# Patient Record
Sex: FEMALE | Race: WHITE | NOT HISPANIC OR LATINO | Employment: UNEMPLOYED | URBAN - METROPOLITAN AREA
[De-identification: names, ages, dates, MRNs, and addresses within clinical notes are randomized per-mention and may not be internally consistent; named-entity substitution may affect disease eponyms.]

---

## 2017-01-03 ENCOUNTER — ALLSCRIPTS OFFICE VISIT (OUTPATIENT)
Dept: OTHER | Facility: OTHER | Age: 42
End: 2017-01-03

## 2017-12-28 ENCOUNTER — LAB REQUISITION (OUTPATIENT)
Dept: LAB | Facility: HOSPITAL | Age: 42
End: 2017-12-28
Payer: COMMERCIAL

## 2017-12-28 ENCOUNTER — ALLSCRIPTS OFFICE VISIT (OUTPATIENT)
Dept: OTHER | Facility: OTHER | Age: 42
End: 2017-12-28

## 2017-12-28 DIAGNOSIS — R35.0 FREQUENCY OF MICTURITION: ICD-10-CM

## 2017-12-28 LAB
BILIRUB UR QL STRIP: NORMAL
CLARITY UR: NORMAL
COLOR UR: YELLOW
GLUCOSE (HISTORICAL): NORMAL
HGB UR QL STRIP.AUTO: NORMAL
KETONES UR STRIP-MCNC: NORMAL MG/DL
LEUKOCYTE ESTERASE UR QL STRIP: NORMAL
NITRITE UR QL STRIP: NORMAL
PH UR STRIP.AUTO: 7 [PH]
PROT UR STRIP-MCNC: NORMAL MG/DL
SP GR UR STRIP.AUTO: 1.01
UROBILINOGEN UR QL STRIP.AUTO: 0.2

## 2017-12-28 PROCEDURE — 87086 URINE CULTURE/COLONY COUNT: CPT | Performed by: FAMILY MEDICINE

## 2017-12-29 LAB — BACTERIA UR CULT: NORMAL

## 2017-12-29 NOTE — PROGRESS NOTES
Assessment   1  Urinary frequency (788 41) (R35 0)   2  Acute right-sided low back pain without sciatica (724 2) (M54 5)    Plan   Abnormal blood sugar, Benign hypertension, Essential familial hyperlipidemia,    Exogenous obesity, Schizophrenia    · (1) CBC/PLT/DIFF; Status:Hold For - Exact Date; Requested for:After S2610508;    · (1) COMPREHENSIVE METABOLIC PANEL; Status:Hold For - Exact Date; Requested    for:After S2610508;    · (1) HEMOGLOBIN A1C; Status:Hold For - Exact Date; Requested for:After S2610508;    · (1) LIPID PANEL FASTING W DIRECT LDL REFLEX; Status:Hold For - Exact Date; Requested for:After S2610508;    · (1) TSH WITH FT4 REFLEX; Status:Hold For - Exact Date; Requested for:After    S2610508;   Urinary frequency    · Ciprofloxacin HCl - 250 MG Oral Tablet; TAKE 1 TABLET EVERY 12 HOURS DAILY   · Urine Dip Automated- POC; Status:Complete - Retrospective By Protocol Authorization;      Done: 62NHH1255 10:56AM    Discussion/Summary      --right-sided low back pain/urinary frequency: 10 day hx of R sided LBP w/ associated urinary sxs (frequency)  No hematuria  no fevers/chills  Pain is worse with forward and side bending  No recent trauma  No gastrointestinal symptoms  Patient does not have a significant history of prior urinary tract infections  Urinalysis today revealed 1+ leukocytes  will send for UC  rec: drink plenty of fluids  will give rx for cipro 250 bid x 7 days  also, cont naproxen prn  will call w/ UC results  if neg, and sxs persist, will consider PT/xrays  also advised patient to schedule a physical examination in the near future  [facility] to physical     Possible side effects of new medications were reviewed with the patient/guardian today  The treatment plan was reviewed with the patient/guardian   The patient/guardian understands and agrees with the treatment plan      Chief Complaint   Pt c/o lower back pain that radiates around to her abdomen x 1 5 weeks  Pt states she's feeling better now that holidays are over and she's eating better  History of Present Illness   HPI: 10 day hx of R sided LBP w/ associated urinary sxs (frequency)  No hematuria  no fevers/chills  Pain is worse with forward and side bending  No recent trauma  No gastrointestinal symptoms  Patient does not have a significant history of prior urinary tract infections  Review of Systems        Constitutional: No fever, no chills, feels well, no tiredness, no recent weight gain or loss  Cardiovascular: no complaints of slow or fast heart rate, no chest pain, no palpitations, no leg claudication or lower extremity edema  Respiratory: no complaints of shortness of breath, no wheezing, no dyspnea on exertion, no orthopnea or PND  Gastrointestinal: no complaints of abdominal pain, no constipation, no nausea or diarrhea, no vomiting, no bloody stools  Genitourinary: as noted in HPI  Musculoskeletal: as noted in HPI  Active Problems   1  Abnormal blood sugar (790 29) (R73 09)   2  Benign hypertension (401 1) (I10)   3  Depression screening (V79 0) (Z13 89)   4  Essential familial hyperlipidemia (272 2) (E78 4)   5  Exogenous obesity (278 00) (E66 9)   6  Extrinsic asthma (493 00) (J45 909)   7  Myalgia And Myositis (729 1)   8  Obstruction of both eustachian tubes (381 60) (H68 103)   9  Premenstrual tension syndrome (625 4) (N94 3)   10  Schizophrenia (295 90) (F20 9)    Past Medical History   1  History of Blood pressure elevated (401 9) (I10)   2  History of Encounter for screening mammogram for malignant neoplasm of breast     (V76 12) (Z12 31)   3  History of acute sinusitis (V12 69) (Z87 09)   4  History of allergic rhinitis (V12 69) (Z87 09)   5  History of herpes zoster (V12 09) (Z86 19)  Active Problems And Past Medical History Reviewed: The active problems and past medical history were reviewed and updated today        Family History Mother    1  Family history of diabetes mellitus (V18 0) (Z83 3)   2  Family history of hyperlipidemia (V18 19) (Z83 49)   3  Family history of hypertension (V17 49) (Z82 49)   4  Family history of multiple sclerosis (V17 2) (Z82 0)    Social History    · Former smoker (O19 33) (S35 338)  The social history was reviewed and updated today  The social history was reviewed and is unchanged  Current Meds    1  Alyacen 1/35 1-35 MG-MCG Oral Tablet; Take 1 tablet daily; Therapy: 55OPI5533 to (Evaluate:30Jun2017)  Requested for: 62BNV4196; Last     XW:35RAT2564 Ordered   2  AmLODIPine Besylate 10 MG Oral Tablet; take one tablet by mouth every day; Therapy: 02KGI5227 to (Last Ming Gustafson)  Requested for: 53Jnu1815 Ordered   3  Benadryl Allergy CAPS; Therapy: (Deb Galeas) to Recorded   4  Daily Value Multivitamin TABS; Therapy: (Deb Galeas) to Recorded   5  Effexor  MG Oral Capsule Extended Release 24 Hour; TAKE 1 CAPSULE Daily     Recorded   6  Nortrel 1/35 (28) 1-35 MG-MCG Oral Tablet; TABS PO X 28; Therapy: 31YST3386 to (Evaluate:35Slz3219)  Requested for: 42Ume8504; Last     Rx:10Lgj4556 Ordered   7  SEROquel 200 MG Oral Tablet; TAKE 1 TABLET AT BEDTIME Recorded   8  Stelazine 10 MG TABS; Take 1 capsule twice daily Recorded     The medication list was reviewed and updated today  Allergies   1  Lisinopril TABS   2  Losartan Potassium-HCTZ TABS    Vitals    Recorded: 58Ccw6600 10:25AM   Temperature 98 3 F, Tympanic   Heart Rate 91   Respiration Quality Normal   Respiration 17   Systolic 691, LUE, Sitting   Diastolic 84, LUE, Sitting   Weight 171 lb 4 oz   BMI Calculated 26 04   BSA Calculated 1 91   O2 Saturation 99, RA   Pain Scale 2     Physical Exam        Constitutional      General appearance: No acute distress, well appearing and well nourished  Abdomen      Abdomen: Non-tender, no masses  -- no cva           Results/Data   Urine Dip Automated- POC [id number] 10: 56AM [facility]      Test Name Result Flag Reference   Color Yellow     Clarity Transparent     Leukocytes 1+     Nitrite -     Blood -     Bilirubin -     Urobilinogen 0 2     Protein -     Ph 7 0     Specific Gravity 1 015     Ketone -     Glucose -          Signatures    Electronically signed by :  Phani Dominguez DO; Dec 28 2017 10:59AM EST                       (Author)

## 2018-01-14 VITALS
BODY MASS INDEX: 26 KG/M2 | DIASTOLIC BLOOD PRESSURE: 82 MMHG | TEMPERATURE: 98.4 F | HEIGHT: 68 IN | SYSTOLIC BLOOD PRESSURE: 124 MMHG | WEIGHT: 171.56 LBS | OXYGEN SATURATION: 98 % | RESPIRATION RATE: 16 BRPM | HEART RATE: 97 BPM

## 2018-01-16 NOTE — PROGRESS NOTES
Assessment    1  Former smoker (V15 82) (M03 197)   2  Benign hypertension (401 1) (I10)   3  Essential familial hyperlipidemia (272 2) (E78 4)   4  Exogenous obesity (278 00) (E66 9)    Plan  Benign hypertension    · Lisinopril 10 MG Oral Tablet; TAKE 1 TABLET DAILY   · Begin a limited exercise program ; Status:Complete;   Done: 26NMY6621  Essential familial hyperlipidemia    · There are many exercise options for seniors ; Status:Complete;   Done: 44QFX0837    Discussion/Summary    --Hypertension: Will start lisinopril 10 mg daily  --Obesity: BMI 29  Patient's target weight loss is 25 pounds at this time  She is on an 6254-5924 calorie per day diet  She will be starting exercise program (with yoga) in the near future  Will continue to monitor  --Hyperlipidemia: Cholesterol 234/158  Patient is intolerant of red yeast  She would prefer to avoid prescription medication at this time  She will work hard on diet and exercise  Will repeat level in a few months    Recheck blood pressure in one month  Possible side effects of new medications were reviewed with the patient/guardian today  The treatment plan was reviewed with the patient/guardian  The patient/guardian understands and agrees with the treatment plan      Chief Complaint  Patient states here to f/u on her blood pressure; States has been elevated x approx  one year and last Wednesday when at Psych (163/100)  History of Present Illness  pts BP has been running high as gyn and psychiatrists office recently  +family hx of HTN  Patient would also like to discuss weight loss and I cholesterol      Review of Systems    Constitutional: No fever, no chills, feels well, no tiredness, no recent weight gain or weight loss  Cardiovascular: No complaints of slow heart rate, no fast heart rate, no chest pain, no palpitations, no leg claudication, no lower extremity edema     Respiratory: No complaints of shortness of breath, no wheezing, no cough, no SOB on exertion, no orthopnea, no PND  Gastrointestinal: No complaints of abdominal pain, no constipation, no nausea or vomiting, no diarrhea, no bloody stools  Genitourinary: No complaints of dysuria, no incontinence, no pelvic pain, no dysmenorrhea, no vaginal discharge or bleeding  Active Problems    1  Essential familial hyperlipidemia (272 2) (E78 4)   2  Extrinsic asthma (493 00) (J45 909)   3  Myalgia And Myositis (729 1)   4  Obstruction of both Eustachian tubes (381 60) (H68 103)   5  Premenstrual tension syndrome (625 4) (N94 3)   6  Schizophrenia (295 90) (F20 9)    Past Medical History    1  History of Blood pressure elevated (796 2) (I10)   2  History of Encounter for screening mammogram for malignant neoplasm of breast   (V76 12) (Z12 31)   3  History of acute sinusitis (V12 69) (Z87 09)   4  History of allergic rhinitis (V12 69) (Z87 09)    The active problems and past medical history were reviewed and updated today  Surgical History    The surgical history was reviewed and updated today  Family History    1  Family history of diabetes mellitus (V18 0) (Z83 3)   2  Family history of hyperlipidemia (V18 19) (Z83 49)   3  Family history of hypertension (V17 49) (Z82 49)   4  Family history of multiple sclerosis (V17 2) (Z82 0)    The family history was reviewed and updated today  Social History    · Former smoker (N18 74) (V40 243)  The social history was reviewed and updated today  The social history was reviewed and is unchanged  Current Meds   1  Benadryl Allergy CAPS; Therapy: (Berenice Shingles) to Recorded   2  Daily Value Multivitamin TABS; Therapy: (Berenice Shingles) to Recorded   3  Effexor  MG Oral Capsule Extended Release 24 Hour; TAKE 1 CAPSULE Daily   Recorded   4  Nortrel 1/35 (28) 1-35 MG-MCG Oral Tablet; TABS PO X 28; Therapy: 43DRY3976 to (Anahy Russo)  Requested for: 77KWA8228; Last   Rx:83Cbr8499 Ordered   5   SEROquel 200 MG Oral Tablet; TAKE 1 TABLET AT BEDTIME Recorded   6  Stelazine 10 MG TABS; Take 1 capsule twice daily Recorded    The medication list was reviewed and updated today  Allergies    1  No Known Drug Allergies    Vitals  Vital Signs [Data Includes: Current Encounter]    Recorded: 97ERY0079 02:36PM   Temperature 99 F   Heart Rate 93   Systolic 237   Diastolic 602   Height 5 ft 8 in   Weight 192 lb    BMI Calculated 29 19   BSA Calculated 2 02   O2 Saturation 98, RA   Pain Scale 0     Physical Exam    Constitutional   General appearance: No acute distress, well appearing and well nourished  Pulmonary   Respiratory effort: No increased work of breathing or signs of respiratory distress  Auscultation of lungs: Clear to auscultation  Cardiovascular   Palpation of heart: Normal PMI, no thrills  Auscultation of heart: Normal rate and rhythm, normal S1 and S2, without murmurs  Examination of extremities for edema and/or varicosities: Normal     Carotid pulses: Normal     Lymphatic   Palpation of lymph nodes in neck: No lymphadenopathy  Psychiatric   Orientation to person, place, and time: Normal     Mood and affect: Normal          Health Management  History of Encounter for screening mammogram for malignant neoplasm of breast   Digital Bilateral Screening Mammogram With CAD; every 1 year; Last 26HVO9103; Next Due:  89Bcl6527; Active    Signatures   Electronically signed by :  Radha Torrez DO; Jan 22 2016  2:58PM EST                       (Author)

## 2018-01-18 NOTE — PROGRESS NOTES
Assessment    1  Encounter for preventive health examination (V70 0) (Z00 00)   2  Benign hypertension (401 1) (I10)   3  Schizophrenia (295 90) (F20 9)   4  Essential familial hyperlipidemia (272 2) (E78 4)   5  Abnormal blood sugar (790 29) (R73 09)    Plan   Abnormal blood sugar, Benign hypertension, Essential familial hyperlipidemia    · (Q) COMPREHENSIVE METABOLIC PNL W/ADJUSTED CALCIUM; Status:Active; Requested MQW:38XZF3045;    · (Q) HEMOGLOBIN A1c WITH eAG; Status:Active; Requested OWL:43HLT9732;    · (Q) LIPID PANEL WITH REFLEX TO DIRECT LDL; Status:Active; Requested  XUL:86URY8557;   Benign hypertension    · From  AmLODIPine Besylate 5 MG Oral Tablet Take 1 tablet daily To  AmLODIPine Besylate 10 MG Oral Tablet (Norvasc) take one tablet by mouth every day  Depression screening    · *VB-Depression Screening; Status:Complete - Retrospective By Protocol Authorization;    Done: 73HIC4726 12:04PM    2 - Miley Rivera DO June  (Obstetrics/Gynecology) Physician Referral  Consult Only: the expectation is that the referring provider will communicate back to the patient on treatment options  Evaluation and Treatment: the expectation is that the referred to provider will communicate back to the patient on treatment options  Status: Hold For - Scheduling  Requested for: M561654  Ordered; For: Health Maintenance;  Ordered By: Glenny Lazo  Performed:   Due: 84RFZ2575     Discussion/Summary    68-year-old physical examination today  Overall, patient feels well  She has lost 25 pounds watching her diet and exercising  She is compliant with all current medications  Lab results from December 20 were reviewed with patient today (other than cholesterol, labs were normal)  I gave patient a referral to see a new gynecologist     --Lipids: Cholesterol 266/170  I had a long discussion with patient regarding diet and exercise  Patient is intolerant of red yeast rice  Will continue to monitor   Consider low-dose statin if levels worsen  --HTN: Controlled on amlodipine 10 mg daily  --Schizophrenia: depression screen today was positive  pt denies any SI  pt still taking effexor, stelazine, and seroquel  cont regular f/u w/ psychiatrist ( behavioral health)  --Prediabetes: A1c improved since patient has lost weight , now 5 4  Blood sugar 84  We'll continue to monitor    6 months, labs prior  Possible side effects of new medications were reviewed with the patient/guardian today  The treatment plan was reviewed with the patient/guardian  The patient/guardian understands and agrees with the treatment plan      Chief Complaint  Pt presents for annual physical with BW review done at [facility]  Pt states she is currently taking the Amlodipine 10 MG  History of Present Illness  HPI: Patient presents for 25-year-old physical examination today  Overall she is doing well  She has lost 25 pounds watching her diet and exercising  Patient still see psychiatrist on a regular basis  She is compliant with all prescribed medications  Review of Systems    Constitutional: No fever, no chills, feels well, no tiredness, no recent weight gain or weight loss  Cardiovascular: No complaints of slow heart rate, no fast heart rate, no chest pain, no palpitations, no leg claudication, no lower extremity edema  Respiratory: No complaints of shortness of breath, no wheezing, no cough, no SOB on exertion, no orthopnea, no PND  Gastrointestinal: No complaints of abdominal pain, no constipation, no nausea or vomiting, no diarrhea, no bloody stools  Genitourinary: No complaints of dysuria, no incontinence, no pelvic pain, no dysmenorrhea, no vaginal discharge or bleeding  Over the past 2 weeks, how often have you been bothered by the following problems? 1 ) Little interest or pleasure in doing things? Nearly every day  2 ) Feeling down, depressed or hopeless? Nearly every day  3 ) Trouble falling asleep or sleeping too much? Nearly every day  4  ) Feeling tired or having little energy? Nearly every day  5 ) Poor appetite or overeating? Several days  6 ) Feeling bad about yourself, or that you are a failure, or have let yourself or your family down? Half the days or more  7 ) Trouble concentrating on things, such as reading a newspaper or watching television? Several days  8 ) Moving or speaking so slowly that other people could have noticed, or the opposite, moving or speaking faster than usual? Half the days or more  severity of depression is moderately severe   How difficult have these problems made it for you to do your work, take care of things at home, or get along with people? Extremely difficult  Score 18      Active Problems    1  Abnormal blood sugar (790 29) (R73 09)   2  Benign hypertension (401 1) (I10)   3  Essential familial hyperlipidemia (272 2) (E78 4)   4  Exogenous obesity (278 00) (E66 9)   5  Extrinsic asthma (493 00) (J45 909)   6  Myalgia And Myositis (729 1)   7  Obstruction of both eustachian tubes (381 60) (H68 103)   8  Premenstrual tension syndrome (625 4) (N94 3)   9  Schizophrenia (295 90) (F20 9)    Past Medical History    · History of Blood pressure elevated (401 9) (I10)   · History of Encounter for screening mammogram for malignant neoplasm of breast  (V76 12) (Z12 31)   · History of acute sinusitis (V12 69) (Z87 09)   · History of allergic rhinitis (V12 69) (Z87 09)   · History of herpes zoster (V12 09) (Z86 19)    Family History  Mother    · Family history of diabetes mellitus (V18 0) (Z83 3)   · Family history of hyperlipidemia (V18 19) (Z83 49)   · Family history of hypertension (V17 49) (Z82 49)   · Family history of multiple sclerosis (V17 2) (Z82 0)    Social History    · Former smoker (V15 82) (R88 330)    Current Meds   1  Alyacen 1/35 1-35 MG-MCG Oral Tablet; Take 1 tablet daily; Therapy: 47EBU3905 to (Abril Khanna)  Requested for: 61MAD1711; Last   Rx:19Nov2016 Ordered   2   AmLODIPine Besylate 5 MG Oral Tablet; Take 1 tablet daily; Therapy: 13YZU1500 to (Evaluate:66Yjj0597)  Requested for: 02UKO2526; Last   Rx:60Szb6904 Ordered   3  Benadryl Allergy CAPS; Therapy: (Ni Garcia) to Recorded   4  Daily Value Multivitamin TABS; Therapy: (Ni Garcia) to Recorded   5  Effexor  MG Oral Capsule Extended Release 24 Hour; TAKE 1 CAPSULE Daily   Recorded   6  Nortrel 1/35 (28) 1-35 MG-MCG Oral Tablet; TABS PO X 28; Therapy: 45XBB5915 to (Evaluate:50Zeh5209)  Requested for: 96Mvo0212; Last   Rx:84Knd6372 Ordered   7  SEROquel 200 MG Oral Tablet; TAKE 1 TABLET AT BEDTIME Recorded   8  Stelazine 10 MG TABS; Take 1 capsule twice daily Recorded    Allergies    1  Lisinopril TABS   2  Losartan Potassium-HCTZ TABS    Vitals   Recorded: 02RQC6827 12:31PM Recorded: 40UWO5620 11:59AM   Temperature  98 4 F, Tympanic   Heart Rate  97, R Radial   Pulse Quality  Norm   Respiration Quality  Norm   Respiration  16   Systolic 696 729, LUE, Sitting   Diastolic 82 80, LUE, Sitting   Height  5 ft 8 in   Weight  171 lb 8 96 oz   BMI Calculated  26 09   BSA Calculated  1 92   O2 Saturation  98, RA   LMP  17-Dec-2016   Pain Scale  0     Physical Exam    Constitutional   General appearance: No acute distress, well appearing and well nourished  Ears, Nose, Mouth, and Throat   Oropharynx: Normal with no erythema, edema, exudate or lesions  Pulmonary   Respiratory effort: No increased work of breathing or signs of respiratory distress  Auscultation of lungs: Clear to auscultation  Cardiovascular   Palpation of heart: Normal PMI, no thrills  Auscultation of heart: Normal rate and rhythm, normal S1 and S2, without murmurs  Examination of extremities for edema and/or varicosities: Normal     Abdomen   Abdomen: Non-tender, no masses  Liver and spleen: No hepatomegaly or splenomegaly  Lymphatic   Palpation of lymph nodes in neck: No lymphadenopathy      Musculoskeletal   Gait and station: Normal     Psychiatric   Orientation to person, place, and time: Normal     Mood and affect: Normal        Results/Data  *VB-Depression Screening 27TPJ3178 12:04PM Tsosie Shed     Test Name Result Flag Reference   Depression Scale Result      Depression Screen - Positive Findings       Health Management  History of Encounter for screening mammogram for malignant neoplasm of breast   Digital Bilateral Screening Mammogram With CAD; every 1 year; Last 38HEL4524; Next  Due: 82Sau3901; Overdue    Signatures   Electronically signed by :  Helena Bosch DO; Juanito  3 2017 12:34PM EST                       (Author)

## 2018-01-23 VITALS
SYSTOLIC BLOOD PRESSURE: 124 MMHG | TEMPERATURE: 98.3 F | RESPIRATION RATE: 17 BRPM | HEART RATE: 91 BPM | DIASTOLIC BLOOD PRESSURE: 84 MMHG | BODY MASS INDEX: 26.04 KG/M2 | WEIGHT: 171.25 LBS | OXYGEN SATURATION: 99 %

## 2018-02-09 ENCOUNTER — TELEPHONE (OUTPATIENT)
Dept: FAMILY MEDICINE CLINIC | Facility: CLINIC | Age: 43
End: 2018-02-09

## 2018-02-09 DIAGNOSIS — I10 ESSENTIAL HYPERTENSION: Primary | ICD-10-CM

## 2018-02-09 RX ORDER — QUETIAPINE FUMARATE 200 MG/1
1 TABLET, FILM COATED ORAL
COMMUNITY

## 2018-02-09 RX ORDER — AMLODIPINE BESYLATE 10 MG/1
10 TABLET ORAL DAILY
Qty: 30 TABLET | Refills: 5 | Status: SHIPPED | OUTPATIENT
Start: 2018-02-09 | End: 2018-08-03 | Stop reason: SDUPTHER

## 2018-02-09 RX ORDER — AMLODIPINE BESYLATE 10 MG/1
1 TABLET ORAL DAILY
COMMUNITY
Start: 2016-03-01 | End: 2018-02-09 | Stop reason: SDUPTHER

## 2018-02-09 RX ORDER — VENLAFAXINE HYDROCHLORIDE 150 MG/1
1 CAPSULE, EXTENDED RELEASE ORAL DAILY
COMMUNITY

## 2018-02-09 NOTE — TELEPHONE ENCOUNTER
Pt noticed that her last rx for amlodipine was sent in as 5mg, She has been on 10mg and has been doubling the 5mg   Can a new rx for 10mg be sent to [facility]

## 2018-05-02 LAB
ALBUMIN SERPL-MCNC: 4 G/DL (ref 3.6–5.1)
ALBUMIN/GLOB SERPL: 1.4 (CALC) (ref 1–2.5)
ALP SERPL-CCNC: 81 U/L (ref 33–115)
ALT SERPL-CCNC: 13 U/L (ref 6–29)
AST SERPL-CCNC: 14 U/L (ref 10–35)
BILIRUB SERPL-MCNC: 0.3 MG/DL (ref 0.2–1.2)
BUN SERPL-MCNC: 12 MG/DL (ref 7–25)
BUN/CREAT SERPL: NORMAL (CALC) (ref 6–22)
CALCIUM ALBUM COR SERPL-MCNC: 8.9 MG/DL (CALC) (ref 8.6–10.2)
CALCIUM SERPL-MCNC: 8.6 MG/DL (ref 8.6–10.2)
CHLORIDE SERPL-SCNC: 102 MMOL/L (ref 98–110)
CHOLEST SERPL-MCNC: 222 MG/DL
CHOLEST/HDLC SERPL: 4.9 (CALC)
CO2 SERPL-SCNC: 26 MMOL/L (ref 20–31)
CREAT SERPL-MCNC: 1.01 MG/DL (ref 0.5–1.1)
EST. AVERAGE GLUCOSE BLD GHB EST-MCNC: 100 (CALC)
EST. AVERAGE GLUCOSE BLD GHB EST-SCNC: 5.5 (CALC)
GLOBULIN SER CALC-MCNC: 2.9 G/DL (CALC) (ref 1.9–3.7)
GLUCOSE SERPL-MCNC: 79 MG/DL (ref 65–99)
HBA1C MFR BLD: 5.1 % OF TOTAL HGB
HDLC SERPL-MCNC: 45 MG/DL
LDLC SERPL CALC-MCNC: 141 MG/DL (CALC)
NONHDLC SERPL-MCNC: 177 MG/DL (CALC)
POTASSIUM SERPL-SCNC: 4.4 MMOL/L (ref 3.5–5.3)
PROT SERPL-MCNC: 6.9 G/DL (ref 6.1–8.1)
SL AMB EGFR AFRICAN AMERICAN: 78 ML/MIN/1.73M2
SL AMB EGFR NON AFRICAN AMERICAN: 67 ML/MIN/1.73M2
SODIUM SERPL-SCNC: 137 MMOL/L (ref 135–146)
TRIGL SERPL-MCNC: 221 MG/DL

## 2018-05-14 ENCOUNTER — OFFICE VISIT (OUTPATIENT)
Dept: FAMILY MEDICINE CLINIC | Facility: CLINIC | Age: 43
End: 2018-05-14
Payer: COMMERCIAL

## 2018-05-14 VITALS
WEIGHT: 174.56 LBS | SYSTOLIC BLOOD PRESSURE: 118 MMHG | BODY MASS INDEX: 26.46 KG/M2 | HEART RATE: 94 BPM | HEIGHT: 68 IN | TEMPERATURE: 98.6 F | RESPIRATION RATE: 18 BRPM | DIASTOLIC BLOOD PRESSURE: 80 MMHG | OXYGEN SATURATION: 98 %

## 2018-05-14 DIAGNOSIS — I10 BENIGN HYPERTENSION: ICD-10-CM

## 2018-05-14 DIAGNOSIS — F20.9 SCHIZOPHRENIA, UNSPECIFIED TYPE (HCC): ICD-10-CM

## 2018-05-14 DIAGNOSIS — Z00.00 WELL ADULT EXAM: Primary | ICD-10-CM

## 2018-05-14 DIAGNOSIS — R73.09 ABNORMAL BLOOD SUGAR: ICD-10-CM

## 2018-05-14 DIAGNOSIS — E78.49 ESSENTIAL FAMILIAL HYPERLIPIDEMIA: ICD-10-CM

## 2018-05-14 PROCEDURE — 99396 PREV VISIT EST AGE 40-64: CPT | Performed by: FAMILY MEDICINE

## 2018-05-14 RX ORDER — ERGOCALCIFEROL (VITAMIN D2) 10 MCG
TABLET ORAL DAILY
COMMUNITY

## 2018-05-14 RX ORDER — TRIFLUOPERAZINE HYDROCHLORIDE 10 MG/1
1 TABLET, FILM COATED ORAL 2 TIMES DAILY
COMMUNITY

## 2018-05-14 NOTE — ASSESSMENT & PLAN NOTE
Overall stable on Effexor, Stelazine, in Seroquel  Patient's psychiatrist is slowly weaning her off of Benadryl

## 2018-05-14 NOTE — PROGRESS NOTES
Assessment/Plan:    Essential familial hyperlipidemia  Chol 222/141, was 266/170  Pt intolerent of red yeast rice  Much improved  Will cont to monitor  Benign hypertension  Well controlled on amlodipine 10 mg qd    Abnormal blood sugar  BS 79, A1C 5 1%  Cont reduced diet  Will cont to monitor  Schizophrenia ([facility] [address] )   Overall stable on Effexor, Stelazine, in Seroquel  Patient's psychiatrist is slowly weaning her off of Benadryl  Diagnoses and all orders for this visit:    Well adult exam  Comments:  38 yo PE today  overall, pt is doiing well  labs reviewed  recheck 3 mos (if stable, then back to q6)    Benign hypertension  -     CBC and differential; Future  -     TSH, 3rd generation with T4 reflex; Future    Essential familial hyperlipidemia  -     Comprehensive metabolic panel; Future  -     Lipid Panel with Direct LDL reflex; Future  -     TSH, 3rd generation with T4 reflex; Future    Abnormal blood sugar  -     Comprehensive metabolic panel; Future  -     HEMOGLOBIN A1C W/ EAG ESTIMATION; Future  -     TSH, 3rd generation with T4 reflex; Future    Schizophrenia, unspecified type ([facility] [address] )    Other orders  -     Diphenhydramine-Phenylephrine 25-10 MG TABS; Take 25 mg by mouth daily     3 mos for med check, if stable, will return to q 6 mos    (6 mos, FBW prior at [facility])    Subjective:      Patient ID: Dawson Liang is a 37 y o  female  Patient presents for recheck of chronic medical problems today  Her psychiatrist is slowly weaning her off of Benadryl and she is having some withdrawal symptoms on this  Doing well on blood pressure medication, amlodipine  Patient did not tolerate red yeast rice for cholesterol    She had labs drawn on May 1st        The following portions of the patient's history were reviewed and updated as appropriate: allergies, current medications, past family history, past medical history, past social history, past surgical history and problem list     Review of Systems Respiratory: Negative  Cardiovascular: Negative  Gastrointestinal: Negative  Genitourinary: Negative  Objective:      /80 (BP Location: Left arm, Patient Position: Sitting, Cuff Size: Adult)   Pulse 94   Temp 98 6 °F (37 °C) (Tympanic)   Resp 18   Ht 5' 8" (1 727 m)   Wt 79 2 kg (174 lb 9 oz)   SpO2 98%   Breastfeeding? No   BMI 26 54 kg/m²          Physical Exam   Constitutional: She is oriented to person, place, and time  She appears well-developed and well-nourished  HENT:   Head: Normocephalic and atraumatic  Right Ear: External ear normal    Left Ear: External ear normal    Mouth/Throat: Oropharynx is clear and moist    Eyes: Pupils are equal, round, and reactive to light  Neck: Normal range of motion  Neck supple  Cardiovascular: Normal rate, regular rhythm and normal heart sounds  Pulmonary/Chest: Effort normal and breath sounds normal    Abdominal: Soft  Bowel sounds are normal    Neurological: She is alert and oriented to person, place, and time  She has normal reflexes  Psychiatric: She has a normal mood and affect  Her behavior is normal  Judgment and thought content normal    Nursing note and vitals reviewed

## 2018-08-03 DIAGNOSIS — I10 ESSENTIAL HYPERTENSION: ICD-10-CM

## 2018-08-03 RX ORDER — AMLODIPINE BESYLATE 10 MG/1
10 TABLET ORAL DAILY
Qty: 30 TABLET | Refills: 5 | Status: SHIPPED | OUTPATIENT
Start: 2018-08-03 | End: 2018-11-27 | Stop reason: SDUPTHER

## 2018-11-26 DIAGNOSIS — I10 ESSENTIAL HYPERTENSION: ICD-10-CM

## 2018-11-27 RX ORDER — AMLODIPINE BESYLATE 10 MG/1
10 TABLET ORAL DAILY
Qty: 30 TABLET | Refills: 0 | Status: SHIPPED | OUTPATIENT
Start: 2018-11-27 | End: 2019-01-08 | Stop reason: SDUPTHER

## 2018-11-27 NOTE — TELEPHONE ENCOUNTER
Pt is due for 6 month follow up with FBW  I left message on pt's cellphone informing her (consent ok)       Teed up for 30 tablets with no refills if appropriate

## 2018-11-30 ENCOUNTER — OFFICE VISIT (OUTPATIENT)
Dept: FAMILY MEDICINE CLINIC | Facility: CLINIC | Age: 43
End: 2018-11-30
Payer: COMMERCIAL

## 2018-11-30 VITALS
HEIGHT: 68 IN | WEIGHT: 181 LBS | BODY MASS INDEX: 27.43 KG/M2 | SYSTOLIC BLOOD PRESSURE: 112 MMHG | DIASTOLIC BLOOD PRESSURE: 80 MMHG | OXYGEN SATURATION: 97 % | RESPIRATION RATE: 16 BRPM | HEART RATE: 101 BPM | TEMPERATURE: 98.7 F

## 2018-11-30 DIAGNOSIS — R73.09 ABNORMAL BLOOD SUGAR: ICD-10-CM

## 2018-11-30 DIAGNOSIS — J01.00 ACUTE NON-RECURRENT MAXILLARY SINUSITIS: Primary | ICD-10-CM

## 2018-11-30 DIAGNOSIS — E78.49 ESSENTIAL FAMILIAL HYPERLIPIDEMIA: ICD-10-CM

## 2018-11-30 DIAGNOSIS — I10 BENIGN HYPERTENSION: ICD-10-CM

## 2018-11-30 LAB
ALBUMIN SERPL BCP-MCNC: 3.6 G/DL (ref 3.5–5)
ALP SERPL-CCNC: 99 U/L (ref 46–116)
ALT SERPL W P-5'-P-CCNC: 25 U/L (ref 12–78)
ANION GAP SERPL CALCULATED.3IONS-SCNC: 10 MMOL/L (ref 4–13)
AST SERPL W P-5'-P-CCNC: 14 U/L (ref 5–45)
BASOPHILS # BLD AUTO: 0.03 THOUSANDS/ΜL (ref 0–0.1)
BASOPHILS NFR BLD AUTO: 0 % (ref 0–1)
BILIRUB SERPL-MCNC: 0.36 MG/DL (ref 0.2–1)
BUN SERPL-MCNC: 8 MG/DL (ref 5–25)
CALCIUM SERPL-MCNC: 9.2 MG/DL (ref 8.3–10.1)
CHLORIDE SERPL-SCNC: 98 MMOL/L (ref 100–108)
CHOLEST SERPL-MCNC: 209 MG/DL (ref 50–200)
CO2 SERPL-SCNC: 22 MMOL/L (ref 21–32)
CREAT SERPL-MCNC: 0.93 MG/DL (ref 0.6–1.3)
EOSINOPHIL # BLD AUTO: 0.17 THOUSAND/ΜL (ref 0–0.61)
EOSINOPHIL NFR BLD AUTO: 2 % (ref 0–6)
ERYTHROCYTE [DISTWIDTH] IN BLOOD BY AUTOMATED COUNT: 12.8 % (ref 11.6–15.1)
EST. AVERAGE GLUCOSE BLD GHB EST-MCNC: 117 MG/DL
GFR SERPL CREATININE-BSD FRML MDRD: 74 ML/MIN/1.73SQ M
GLUCOSE P FAST SERPL-MCNC: 77 MG/DL (ref 65–99)
HBA1C MFR BLD: 5.7 % (ref 4.2–6.3)
HCT VFR BLD AUTO: 43.7 % (ref 34.8–46.1)
HDLC SERPL-MCNC: 41 MG/DL (ref 40–60)
HGB BLD-MCNC: 14.3 G/DL (ref 11.5–15.4)
IMM GRANULOCYTES # BLD AUTO: 0.05 THOUSAND/UL (ref 0–0.2)
IMM GRANULOCYTES NFR BLD AUTO: 0 % (ref 0–2)
LDLC SERPL CALC-MCNC: 128 MG/DL (ref 0–100)
LYMPHOCYTES # BLD AUTO: 2.28 THOUSANDS/ΜL (ref 0.6–4.47)
LYMPHOCYTES NFR BLD AUTO: 20 % (ref 14–44)
MCH RBC QN AUTO: 29.8 PG (ref 26.8–34.3)
MCHC RBC AUTO-ENTMCNC: 32.7 G/DL (ref 31.4–37.4)
MCV RBC AUTO: 91 FL (ref 82–98)
MONOCYTES # BLD AUTO: 0.81 THOUSAND/ΜL (ref 0.17–1.22)
MONOCYTES NFR BLD AUTO: 7 % (ref 4–12)
NEUTROPHILS # BLD AUTO: 8.21 THOUSANDS/ΜL (ref 1.85–7.62)
NEUTS SEG NFR BLD AUTO: 71 % (ref 43–75)
NRBC BLD AUTO-RTO: 0 /100 WBCS
PLATELET # BLD AUTO: 571 THOUSANDS/UL (ref 149–390)
PMV BLD AUTO: 10.1 FL (ref 8.9–12.7)
POTASSIUM SERPL-SCNC: 3.6 MMOL/L (ref 3.5–5.3)
PROT SERPL-MCNC: 8 G/DL (ref 6.4–8.2)
RBC # BLD AUTO: 4.8 MILLION/UL (ref 3.81–5.12)
SODIUM SERPL-SCNC: 130 MMOL/L (ref 136–145)
TRIGL SERPL-MCNC: 198 MG/DL
TSH SERPL DL<=0.05 MIU/L-ACNC: 2.37 UIU/ML (ref 0.36–3.74)
WBC # BLD AUTO: 11.55 THOUSAND/UL (ref 4.31–10.16)

## 2018-11-30 PROCEDURE — 80061 LIPID PANEL: CPT | Performed by: FAMILY MEDICINE

## 2018-11-30 PROCEDURE — 85025 COMPLETE CBC W/AUTO DIFF WBC: CPT | Performed by: FAMILY MEDICINE

## 2018-11-30 PROCEDURE — 99213 OFFICE O/P EST LOW 20 MIN: CPT | Performed by: FAMILY MEDICINE

## 2018-11-30 PROCEDURE — 36415 COLL VENOUS BLD VENIPUNCTURE: CPT | Performed by: FAMILY MEDICINE

## 2018-11-30 PROCEDURE — 3008F BODY MASS INDEX DOCD: CPT | Performed by: FAMILY MEDICINE

## 2018-11-30 PROCEDURE — 83036 HEMOGLOBIN GLYCOSYLATED A1C: CPT | Performed by: FAMILY MEDICINE

## 2018-11-30 PROCEDURE — 80053 COMPREHEN METABOLIC PANEL: CPT | Performed by: FAMILY MEDICINE

## 2018-11-30 PROCEDURE — 84443 ASSAY THYROID STIM HORMONE: CPT | Performed by: FAMILY MEDICINE

## 2018-11-30 RX ORDER — AMOXICILLIN 875 MG/1
875 TABLET, COATED ORAL 2 TIMES DAILY
Qty: 20 TABLET | Refills: 0 | Status: SHIPPED | OUTPATIENT
Start: 2018-11-30 | End: 2018-12-10

## 2018-11-30 RX ORDER — LEVONORGESTREL / ETHINYL ESTRADIOL AND ETHINYL ESTRADIOL 150-30(84)
KIT ORAL
COMMUNITY
End: 2019-07-09 | Stop reason: SDUPTHER

## 2018-11-30 NOTE — PROGRESS NOTES
Assessment/Plan:       Diagnoses and all orders for this visit:    Acute non-recurrent maxillary sinusitis  Comments:  amoxil 875 bid x 10 days  drink plenty fluids  call further probs  Orders:  -     amoxicillin (AMOXIL) 875 mg tablet; Take 1 tablet (875 mg total) by mouth 2 (two) times a day for 10 days     WILL DRAW ROUTINE FBW TODAY  PT HAS F/U APPT SCHEDULED    Subjective:      Patient ID: Monica Wright is a 37 y o  female  1 week hx of nasal congestion/pressure, swollen lymph node, no fevers  The following portions of the patient's history were reviewed and updated as appropriate: allergies, current medications, past family history, past medical history, past social history, past surgical history and problem list     Review of Systems   Constitutional: Negative for chills and fever  HENT: Positive for congestion and postnasal drip  Respiratory: Positive for cough  Negative for shortness of breath  Cardiovascular: Negative  Objective:      /80 (BP Location: Left arm, Patient Position: Sitting, Cuff Size: Adult)   Pulse 101   Temp 98 7 °F (37 1 °C) (Tympanic)   Resp 16   Ht 5' 8" (1 727 m)   Wt 82 1 kg (181 lb)   SpO2 97%   BMI 27 52 kg/m²          Physical Exam   Constitutional: She appears well-developed and well-nourished  HENT:   Turbinates inflamed   Neck: Normal range of motion  Neck supple     Pulmonary/Chest: Effort normal and breath sounds normal

## 2019-01-08 ENCOUNTER — OFFICE VISIT (OUTPATIENT)
Dept: FAMILY MEDICINE CLINIC | Facility: CLINIC | Age: 44
End: 2019-01-08
Payer: COMMERCIAL

## 2019-01-08 VITALS
TEMPERATURE: 100.3 F | WEIGHT: 185 LBS | OXYGEN SATURATION: 97 % | RESPIRATION RATE: 16 BRPM | SYSTOLIC BLOOD PRESSURE: 120 MMHG | BODY MASS INDEX: 28.04 KG/M2 | HEART RATE: 112 BPM | DIASTOLIC BLOOD PRESSURE: 80 MMHG | HEIGHT: 68 IN

## 2019-01-08 DIAGNOSIS — I10 ESSENTIAL HYPERTENSION: ICD-10-CM

## 2019-01-08 DIAGNOSIS — R73.09 ABNORMAL BLOOD SUGAR: ICD-10-CM

## 2019-01-08 DIAGNOSIS — I10 BENIGN HYPERTENSION: ICD-10-CM

## 2019-01-08 DIAGNOSIS — F20.9 SCHIZOPHRENIA, UNSPECIFIED TYPE (HCC): ICD-10-CM

## 2019-01-08 DIAGNOSIS — E78.49 ESSENTIAL FAMILIAL HYPERLIPIDEMIA: Primary | ICD-10-CM

## 2019-01-08 PROCEDURE — 99214 OFFICE O/P EST MOD 30 MIN: CPT | Performed by: FAMILY MEDICINE

## 2019-01-08 PROCEDURE — 3074F SYST BP LT 130 MM HG: CPT | Performed by: FAMILY MEDICINE

## 2019-01-08 PROCEDURE — 3079F DIAST BP 80-89 MM HG: CPT | Performed by: FAMILY MEDICINE

## 2019-01-08 PROCEDURE — 1036F TOBACCO NON-USER: CPT | Performed by: FAMILY MEDICINE

## 2019-01-08 PROCEDURE — 3008F BODY MASS INDEX DOCD: CPT | Performed by: FAMILY MEDICINE

## 2019-01-08 RX ORDER — AMLODIPINE BESYLATE 5 MG/1
5 TABLET ORAL DAILY
Qty: 90 TABLET | Refills: 1 | Status: SHIPPED | OUTPATIENT
Start: 2019-01-08 | End: 2019-02-25 | Stop reason: SDUPTHER

## 2019-01-08 NOTE — ASSESSMENT & PLAN NOTE
Blood sugar 77, A1c 5 7%  Discussed improving diet and increasing exercise    Will continue to monitor yearly

## 2019-01-08 NOTE — PROGRESS NOTES
[facility]      NAME: Wyatt Bond  AGE: 37 y o  SEX: female  : 1975   MRN: [id number]    DATE: 2019  TIME: 6:09 PM    Assessment and Plan     Problem List Items Addressed This Visit     Benign hypertension      Blood pressure is well controlled on amlodipine 10 mg daily  But when patient has been weaning her diphenhydramine phenylephrine below 8 pills per day, she gets lightheaded again  Will reduce amlodipine to 5 mg daily  Gerhardt Sep She will then again attempt to slowly wean off of diphenhydramine phenylephrine  She is currently at 12 pills daily  Call further problems  I would like to see her back in 3 months         Relevant Medications    amLODIPine (NORVASC) 5 mg tablet    Essential familial hyperlipidemia - Primary      Cholesterol 209/128  Continue red yeast rice  Will continue to monitor yearly         Abnormal blood sugar       Blood sugar 77, A1c 5 7%  Discussed improving diet and increasing exercise  Will continue to monitor yearly         Schizophrenia [facility])      Continue regular follow-up with her psychiatrist   Overall stable on Effexor, Stelazine, and Seroquel  Other Visit Diagnoses     Essential hypertension        Relevant Medications    amLODIPine (NORVASC) 5 mg tablet              Return to office in: 3 mos (will recheck bp at that time)  If stable, then q6 (labs yearly)    Chief Complaint     Chief Complaint   Patient presents with    Follow-up     review BW        History of Present Illness      Patient presents for recheck of chronic medical problems today  Her diet has not been good over the holidays  She has not been exercising recently  She has gained weight recently  Patient is concerned with her diphenhydramine/ phenylephrine  Patient is on this due to syncopal episodes  She states she has gradually trying to decrease this but gets dizzy and lightheaded when she gets below 8 pills per day  Otherwise she is feeling well    Doing well on red yeast rice for cholesterol  The following portions of the patient's history were reviewed and updated as appropriate: allergies, current medications, past family history, past medical history, past social history, past surgical history and problem list     Review of Systems   Review of Systems   Respiratory: Negative  Cardiovascular: Negative  Gastrointestinal: Negative  Genitourinary: Negative  Active Problem List     Patient Active Problem List   Diagnosis    Benign hypertension    Essential familial hyperlipidemia    Abnormal blood sugar    Schizophrenia (HCC)       Objective   /80 (BP Location: Left arm, Patient Position: Sitting, Cuff Size: Adult)   Pulse (!) 112   Temp 100 3 °F (37 9 °C) (Tympanic)   Resp 16   Ht 5' 7 72" (1 72 m)   Wt 83 9 kg (185 lb)   SpO2 97%   BMI 28 36 kg/m²     Physical Exam   Cardiovascular: Normal rate, regular rhythm, normal heart sounds and intact distal pulses  Carotids: no bruits  Ext: no edema   Pulmonary/Chest: Effort normal  No respiratory distress  She has no wheezes  She has no rales  Psychiatric: She has a normal mood and affect   Her behavior is normal  Thought content normal        Pertinent Laboratory/Diagnostic Studies:   lab results    Current Medications     Current Outpatient Prescriptions:     amLODIPine (NORVASC) 5 mg tablet, Take 1 tablet (5 mg total) by mouth daily, Disp: 90 tablet, Rfl: 1    Multiple Vitamin (DAILY VALUE MULTIVITAMIN) TABS, Take by mouth daily, Disp: , Rfl:     QUEtiapine (SEROQUEL) 200 mg tablet, Take 1 tablet by mouth, Disp: , Rfl:     trifluoperazine (STELAZINE) 10 MG tablet, Take 1 capsule by mouth 2 (two) times a day, Disp: , Rfl:     venlafaxine (EFFEXOR XR) 150 mg 24 hr capsule, Take 1 capsule by mouth daily, Disp: , Rfl:     venlafaxine (EFFEXOR-XR) 75 mg 24 hr capsule, TAKE 1 CAP DAILY ALONG WITH 150MG FOR TOTAL OF 225MG DAILY, Disp: , Rfl: 5   Diphenhydramine-Phenylephrine 25-10 MG TABS, Take 25 mg by mouth daily, Disp: , Rfl:     Levonorgest-Eth Estrad 91-Day (DAYSEE) 0 15-0 03 &0 01 MG TABS, TAKE 1 TABLET BY MOUTH EVERY DAY, Disp: , Rfl:     Levonorgest-Eth Estrad 91-Day 0 15-0 03 &0 01 MG TABS, Take 1 tablet by mouth daily, Disp: , Rfl: 4    norethindrone-ethinyl estradiol (ALYACEN 1/35) 1-35 MG-MCG per tablet, TAKE 1 TABLET(S) EVERY DAY BY ORAL ROUTE , Disp: , Rfl:     norethindrone-ethinyl estradiol (Bolivar Ely 1/35, 28,) 1-35 MG-MCG per tablet, Take by mouth, Disp: , Rfl:     Health Maintenance     Health Maintenance   Topic Date Due    DTaP,Tdap,and Td Vaccines (1 - Tdap) 03/01/1994    PAP SMEAR  06/11/2018    MAMMOGRAM  07/10/2018    Depression Screening PHQ  05/14/2019    INFLUENZA VACCINE  Completed     Immunization History   Administered Date(s) Administered     Influenza (IM) Preservative Free 09/24/2015, 09/30/2018    Influenza 10/01/2015, 09/01/2016, 09/25/2016, 09/29/2017, 09/30/2017    Influenza Quadrivalent, 6-35 Months IM 09/01/2016, 09/30/2017    Influenza TIV (IM) 10/01/2015       Ruth Harmon DO  [facility]

## 2019-01-08 NOTE — ASSESSMENT & PLAN NOTE
Blood pressure is well controlled on amlodipine 10 mg daily  But when patient has been weaning her diphenhydramine phenylephrine below 8 pills per day, she gets lightheaded again  Will reduce amlodipine to 5 mg daily  Jacques Garza She will then again attempt to slowly wean off of diphenhydramine phenylephrine  She is currently at 12 pills daily  Call further problems    I would like to see her back in 3 months

## 2019-02-25 DIAGNOSIS — I10 ESSENTIAL HYPERTENSION: ICD-10-CM

## 2019-02-25 RX ORDER — AMLODIPINE BESYLATE 5 MG/1
5 TABLET ORAL DAILY
Qty: 90 TABLET | Refills: 1 | Status: SHIPPED | OUTPATIENT
Start: 2019-02-25 | End: 2019-02-28

## 2019-02-28 ENCOUNTER — TELEPHONE (OUTPATIENT)
Dept: FAMILY MEDICINE CLINIC | Facility: CLINIC | Age: 44
End: 2019-02-28

## 2019-02-28 DIAGNOSIS — I10 ESSENTIAL HYPERTENSION: ICD-10-CM

## 2019-02-28 RX ORDER — AMLODIPINE BESYLATE 10 MG/1
10 TABLET ORAL DAILY
Qty: 90 TABLET | Refills: 1 | Status: SHIPPED | OUTPATIENT
Start: 2019-02-28 | End: 2019-09-14 | Stop reason: SDUPTHER

## 2019-02-28 NOTE — TELEPHONE ENCOUNTER
Called pt about written rx that was ready for  she sounded confused because she didn't call for this  When I spoke with her she said she hasn't changed anything she's still taking the benadryl the same so she wants to keep her Amlodipine 10 the same instead of deceasing to 5mg if ok  Can you please call to [facility]   rx written was destroyed

## 2019-03-01 ENCOUNTER — TELEPHONE (OUTPATIENT)
Dept: FAMILY MEDICINE CLINIC | Facility: CLINIC | Age: 44
End: 2019-03-01

## 2019-03-01 NOTE — TELEPHONE ENCOUNTER
I called and left a message for Donnie Roa oh her home phone consent ok   informing her that her script was printed and I will be calling her script into the [facility]  I called and gave a verbal to the pharmacist for amlodipine 10 mg tablet qty 90 with one refill prescribing doctor is  Agustina Foy  Pt is to take 1 tablet by mouth daily   Printed script from 2/28/2019 was called in verbally so printed script was shreded

## 2019-06-27 ENCOUNTER — OFFICE VISIT (OUTPATIENT)
Dept: OBGYN CLINIC | Facility: CLINIC | Age: 44
End: 2019-06-27
Payer: COMMERCIAL

## 2019-06-27 VITALS
BODY MASS INDEX: 27.88 KG/M2 | DIASTOLIC BLOOD PRESSURE: 70 MMHG | SYSTOLIC BLOOD PRESSURE: 118 MMHG | WEIGHT: 188.2 LBS | HEIGHT: 69 IN

## 2019-06-27 DIAGNOSIS — Z12.31 ENCOUNTER FOR SCREENING MAMMOGRAM FOR MALIGNANT NEOPLASM OF BREAST: ICD-10-CM

## 2019-06-27 DIAGNOSIS — Z01.419 ENCNTR FOR GYN EXAM (GENERAL) (ROUTINE) W/O ABN FINDINGS: Primary | ICD-10-CM

## 2019-06-27 PROCEDURE — S0610 ANNUAL GYNECOLOGICAL EXAMINA: HCPCS | Performed by: NURSE PRACTITIONER

## 2019-06-27 RX ORDER — AMLODIPINE BESYLATE 10 MG/1
TABLET ORAL
COMMUNITY
End: 2019-08-09

## 2019-07-08 ENCOUNTER — TELEPHONE (OUTPATIENT)
Dept: OBGYN CLINIC | Facility: CLINIC | Age: 44
End: 2019-07-08

## 2019-07-08 NOTE — TELEPHONE ENCOUNTER
Patient was seen on 06/27/19 Birth control was not called into the pharmacy as discussed  The patients desires to have the Levonorgestrel ethinyl estradiol  The pharmacy on file was confirmed

## 2019-07-09 DIAGNOSIS — Z30.41 SURVEILLANCE FOR BIRTH CONTROL, ORAL CONTRACEPTIVES: Primary | ICD-10-CM

## 2019-07-09 RX ORDER — LEVONORGESTREL / ETHINYL ESTRADIOL AND ETHINYL ESTRADIOL 150-30(84)
1 KIT ORAL DAILY
Qty: 91 EACH | Refills: 3 | Status: SHIPPED | OUTPATIENT
Start: 2019-07-09 | End: 2020-07-16 | Stop reason: SDUPTHER

## 2019-08-09 ENCOUNTER — OFFICE VISIT (OUTPATIENT)
Dept: FAMILY MEDICINE CLINIC | Facility: CLINIC | Age: 44
End: 2019-08-09
Payer: COMMERCIAL

## 2019-08-09 VITALS
SYSTOLIC BLOOD PRESSURE: 118 MMHG | TEMPERATURE: 98.6 F | WEIGHT: 186 LBS | DIASTOLIC BLOOD PRESSURE: 78 MMHG | OXYGEN SATURATION: 98 % | HEART RATE: 83 BPM | BODY MASS INDEX: 28.19 KG/M2 | RESPIRATION RATE: 16 BRPM | HEIGHT: 68 IN

## 2019-08-09 DIAGNOSIS — F41.9 ANXIETY: ICD-10-CM

## 2019-08-09 DIAGNOSIS — R73.09 ABNORMAL BLOOD SUGAR: ICD-10-CM

## 2019-08-09 DIAGNOSIS — E78.49 ESSENTIAL FAMILIAL HYPERLIPIDEMIA: ICD-10-CM

## 2019-08-09 DIAGNOSIS — F20.9 SCHIZOPHRENIA, UNSPECIFIED TYPE (HCC): Primary | ICD-10-CM

## 2019-08-09 DIAGNOSIS — I10 BENIGN HYPERTENSION: ICD-10-CM

## 2019-08-09 DIAGNOSIS — R07.89 CHEST TIGHTNESS: ICD-10-CM

## 2019-08-09 PROCEDURE — 93000 ELECTROCARDIOGRAM COMPLETE: CPT | Performed by: FAMILY MEDICINE

## 2019-08-09 PROCEDURE — 99214 OFFICE O/P EST MOD 30 MIN: CPT | Performed by: FAMILY MEDICINE

## 2019-08-09 PROCEDURE — 3078F DIAST BP <80 MM HG: CPT | Performed by: FAMILY MEDICINE

## 2019-08-09 PROCEDURE — 3008F BODY MASS INDEX DOCD: CPT | Performed by: FAMILY MEDICINE

## 2019-08-09 PROCEDURE — 3074F SYST BP LT 130 MM HG: CPT | Performed by: FAMILY MEDICINE

## 2019-08-09 PROCEDURE — 1036F TOBACCO NON-USER: CPT | Performed by: FAMILY MEDICINE

## 2019-08-09 NOTE — ASSESSMENT & PLAN NOTE
Patient complains few episodes (chest seizing/tightness) in the past few days  These occur at rest   She does feel anxious  The last few seconds  Also some shortness of breath no radiation of symptoms  Upon examination, she does exhibit some reproducible pain with deep inspiration, otherwise her exam is unremarkable     EKG today did not show any acute issues     Recommend stress reduction  I also advised her to follow up with her psychiatrist should anxiety issues persist   I advised patient to slowly begin an exercise program   If he develops any chest pain upon exertion, she is to call me immediately    I will send her for a stress test

## 2019-08-09 NOTE — ASSESSMENT & PLAN NOTE
No recent changes in her medication  She still sees her psychiatrist regularly  Still taking Effexor, Stelazine, and Seroquel    I encouraged her to follow up with her psychiatrist if her anxiety worsens

## 2019-08-09 NOTE — ASSESSMENT & PLAN NOTE
(see chest pain)  I believe her symptoms are being caused by underlying anxiety    Recommend discussing this with her psychiatrist

## 2019-08-09 NOTE — ASSESSMENT & PLAN NOTE
History of elevated blood sugar  Last A1c 5 7%  Continue reduced carb diet and exercise    Will continue to check labs yearly

## 2019-08-09 NOTE — PROGRESS NOTES
[facility]      NAME: Rachel Smith  AGE: 53 y o  SEX: female  : 1975   MRN: [id number]    DATE: 2019  TIME: 10:50 AM    Assessment and Plan     Problem List Items Addressed This Visit     Benign hypertension     Under very good control on amlodipine 10 mg daily  Will continue to monitor         Relevant Orders    CBC and differential    TSH, 3rd generation with Free T4 reflex    Essential familial hyperlipidemia     Patient no longer taking red yeast rice  I encouraged her to restart this  Will check labs prior to next appointment         Relevant Orders    CBC and differential    Lipid Panel with Direct LDL reflex    Abnormal blood sugar     History of elevated blood sugar  Last A1c 5 7%  Continue reduced carb diet and exercise  Will continue to check labs yearly         Relevant Orders    Comprehensive metabolic panel    Hemoglobin A1C    [facility] - Primary     No recent changes in her medication  She still sees her psychiatrist regularly  Still taking Effexor, Stelazine, and Seroquel  I encouraged her to follow up with her psychiatrist if her anxiety worsens         Chest tightness     Patient complains few episodes (chest seizing/tightness) in the past few days  These occur at rest   She does feel anxious  The last few seconds  Also some shortness of breath no radiation of symptoms  Upon examination, she does exhibit some reproducible pain with deep inspiration, otherwise her exam is unremarkable     EKG today did not show any acute issues     Recommend stress reduction  I also advised her to follow up with her psychiatrist should anxiety issues persist   I advised patient to slowly begin an exercise program   If he develops any chest pain upon exertion, she is to call me immediately  I will send her for a stress test          Relevant Orders    POCT ECG (Completed)    Anxiety     (see chest pain)    I believe her symptoms are being caused by underlying anxiety  Recommend discussing this with her psychiatrist                   Return to office in: 6 mos (for her yearly check)    Chief Complaint     Chief Complaint   Patient presents with    Follow-up     HBP       History of Present Illness     Patient complains few episodes (chest seizing/tightness) in the past few days  These occur at rest   She does feel anxious  The last few seconds  Also some shortness of breath no radiation of symptoms  Otherwise patient is doing well  Doing well on blood pressure medication  Still sees psychiatrist regularly  No recent medication changes  Patient has not been taking red yeast rice for cholesterol elevation  The following portions of the patient's history were reviewed and updated as appropriate: allergies, current medications, past family history, past medical history, past social history, past surgical history and problem list     Review of Systems   Review of Systems   Respiratory: Positive for chest tightness and shortness of breath  Cardiovascular: Negative  Gastrointestinal: Negative  Genitourinary: Negative  Psychiatric/Behavioral: The patient is nervous/anxious  Active Problem List     Patient Active Problem List   Diagnosis    Benign hypertension    Essential familial hyperlipidemia    Abnormal blood sugar    Schizophrenia (HCC)    Chest tightness    Anxiety       Objective   /78 (BP Location: Left arm, Patient Position: Sitting, Cuff Size: Adult)   Pulse 83   Temp 98 6 °F (37 °C) (Tympanic)   Resp 16   Ht 5' 7 72" (1 72 m)   Wt 84 4 kg (186 lb)   SpO2 98%   BMI 28 52 kg/m²     Physical Exam   Cardiovascular: Normal rate, regular rhythm, normal heart sounds and intact distal pulses  Carotids: no bruits  Ext: no edema   Pulmonary/Chest: Effort normal  No respiratory distress  She has no wheezes  She has no rales  Psychiatric: She has a normal mood and affect   Her behavior is normal  Thought content normal  Pertinent Laboratory/Diagnostic Studies:  none    Current Medications     Current Outpatient Medications:     amLODIPine (NORVASC) 10 mg tablet, Take 1 tablet (10 mg total) by mouth daily, Disp: 90 tablet, Rfl: 1    Diphenhydramine-Phenylephrine 25-10 MG TABS, Take 25 mg by mouth daily, Disp: , Rfl:     Levonorgest-Eth Estrad 91-Day (DAYSEE) 0 15-0 03 &0 01 MG TABS, Take 1 tablet by mouth daily, Disp: 91 each, Rfl: 3    QUEtiapine (SEROQUEL) 200 mg tablet, Take 1 tablet by mouth, Disp: , Rfl:     trifluoperazine (STELAZINE) 10 MG tablet, Take 1 capsule by mouth 2 (two) times a day, Disp: , Rfl:     venlafaxine (EFFEXOR XR) 150 mg 24 hr capsule, Take 1 capsule by mouth daily, Disp: , Rfl:     venlafaxine (EFFEXOR-XR) 75 mg 24 hr capsule, TAKE 1 CAP DAILY ALONG WITH 150MG FOR TOTAL OF 225MG DAILY, Disp: , Rfl: 5    influenza vaccine, quadrivalent (FLULAVAL) 0 5 ML ROCIO, Fluarix Quad [id number] (PF) 60 mcg (15 mcg x 4)/0 5 mL IM syringe  TO BE ADMINISTERED BY PHARMACIST FOR IMMUNIZATION, Disp: , Rfl:     Multiple Vitamin (DAILY VALUE MULTIVITAMIN) TABS, Take by mouth daily, Disp: , Rfl:     Health Maintenance     Health Maintenance   Topic Date Due    BMI: Followup Plan  03/01/1991    PAP SMEAR  06/11/2018    MAMMOGRAM  07/10/2018    INFLUENZA VACCINE  07/01/2019    DTaP,Tdap,and Td Vaccines (1 - Tdap) 08/09/2020 (Originally 3/1/1994)    BMI: Adult  06/27/2020    Pneumococcal Vaccine: 65+ Years (1 of 2 - PCV13) 03/01/2038    Pneumococcal Vaccine: Pediatrics (0 to 5 Years) and At-Risk Patients (6 to 59 Years)  Aged Out    HEPATITIS B VACCINES  Aged [employer] History   Administered Date(s) Administered     Influenza (IM) Preservative Free 09/24/2015, 09/30/2018    INFLUENZA 10/01/2015, 09/01/2016, 09/25/2016, 09/29/2017, 09/30/2017    Influenza Quadrivalent, 6-35 Months IM 09/01/2016, 09/30/2017    Influenza TIV (IM) 10/01/2015       Nazario Sullivan DO    [facility]

## 2019-08-09 NOTE — ASSESSMENT & PLAN NOTE
Patient no longer taking red yeast rice  I encouraged her to restart this    Will check labs prior to next appointment

## 2019-09-14 DIAGNOSIS — I10 ESSENTIAL HYPERTENSION: ICD-10-CM

## 2019-09-14 RX ORDER — AMLODIPINE BESYLATE 10 MG/1
TABLET ORAL
Qty: 90 TABLET | Refills: 1 | Status: SHIPPED | OUTPATIENT
Start: 2019-09-14 | End: 2020-03-02

## 2020-03-02 DIAGNOSIS — I10 ESSENTIAL HYPERTENSION: ICD-10-CM

## 2020-03-02 RX ORDER — AMLODIPINE BESYLATE 10 MG/1
TABLET ORAL
Qty: 90 TABLET | Refills: 1 | Status: SHIPPED | OUTPATIENT
Start: 2020-03-02 | End: 2020-09-15 | Stop reason: SDUPTHER

## 2020-07-16 DIAGNOSIS — Z30.41 SURVEILLANCE FOR BIRTH CONTROL, ORAL CONTRACEPTIVES: ICD-10-CM

## 2020-07-16 RX ORDER — LEVONORGESTREL / ETHINYL ESTRADIOL AND ETHINYL ESTRADIOL 150-30(84)
1 KIT ORAL DAILY
Qty: 91 EACH | Refills: 3 | Status: SHIPPED | OUTPATIENT
Start: 2020-07-16 | End: 2021-11-15

## 2020-08-04 ENCOUNTER — TELEPHONE (OUTPATIENT)
Dept: OBGYN CLINIC | Facility: MEDICAL CENTER | Age: 45
End: 2020-08-04

## 2020-08-04 NOTE — TELEPHONE ENCOUNTER
Patient was scheduled for a virtual visit for birth control but RX was sent on 7/16/20 with 3 refills  Pt needs a yearly visit but does not feel safe coming to office   Did not want to schedule appointment today  PT will call before she starts last pack to schedule yearly

## 2020-09-15 DIAGNOSIS — I10 ESSENTIAL HYPERTENSION: ICD-10-CM

## 2020-09-15 RX ORDER — AMLODIPINE BESYLATE 10 MG/1
10 TABLET ORAL DAILY
Qty: 90 TABLET | Refills: 1 | Status: SHIPPED | OUTPATIENT
Start: 2020-09-15 | End: 2021-02-24 | Stop reason: SDUPTHER

## 2021-02-24 DIAGNOSIS — I10 ESSENTIAL HYPERTENSION: ICD-10-CM

## 2021-02-24 RX ORDER — AMLODIPINE BESYLATE 10 MG/1
10 TABLET ORAL DAILY
Qty: 30 TABLET | Refills: 0 | Status: SHIPPED | OUTPATIENT
Start: 2021-02-24 | End: 2021-02-25

## 2021-02-24 NOTE — TELEPHONE ENCOUNTER
Voicemail from patient requesting refill on Amlodipine 10 mg  Patient last seen 8/2019  I called and left her a message informing her that she needs to make an appointment in the office  Will sent 30 day supply to hold her until she can be seen

## 2021-02-25 RX ORDER — AMLODIPINE BESYLATE 10 MG/1
TABLET ORAL
Qty: 90 TABLET | Refills: 1 | Status: SHIPPED | OUTPATIENT
Start: 2021-02-25 | End: 2021-05-03 | Stop reason: SDUPTHER

## 2021-03-04 ENCOUNTER — TELEPHONE (OUTPATIENT)
Dept: FAMILY MEDICINE CLINIC | Facility: CLINIC | Age: 46
End: 2021-03-04

## 2021-03-04 NOTE — TELEPHONE ENCOUNTER
Pt called has appt for 4/1/21 would like blwk orders for [facility] please fax to [phone]

## 2021-03-05 DIAGNOSIS — I10 BENIGN HYPERTENSION: ICD-10-CM

## 2021-03-05 DIAGNOSIS — R73.09 ABNORMAL BLOOD SUGAR: ICD-10-CM

## 2021-03-05 DIAGNOSIS — E78.49 ESSENTIAL FAMILIAL HYPERLIPIDEMIA: Primary | ICD-10-CM

## 2021-03-31 LAB
25(OH)D3 SERPL-MCNC: 38 NG/ML (ref 30–100)
ALBUMIN SERPL-MCNC: 4 G/DL (ref 3.6–5.1)
ALBUMIN/GLOB SERPL: 1.4 (CALC) (ref 1–2.5)
ALP SERPL-CCNC: 87 U/L (ref 31–125)
ALT SERPL-CCNC: 13 U/L (ref 6–29)
AST SERPL-CCNC: 14 U/L (ref 10–35)
BASOPHILS # BLD AUTO: 62 CELLS/UL (ref 0–200)
BASOPHILS NFR BLD AUTO: 0.5 %
BILIRUB SERPL-MCNC: 0.4 MG/DL (ref 0.2–1.2)
BUN SERPL-MCNC: 11 MG/DL (ref 7–25)
BUN/CREAT SERPL: NORMAL (CALC) (ref 6–22)
CALCIUM SERPL-MCNC: 8.8 MG/DL (ref 8.6–10.2)
CHLORIDE SERPL-SCNC: 98 MMOL/L (ref 98–110)
CHOLEST SERPL-MCNC: 222 MG/DL
CHOLEST/HDLC SERPL: 4.7 (CALC)
CO2 SERPL-SCNC: 25 MMOL/L (ref 20–32)
CREAT SERPL-MCNC: 0.93 MG/DL (ref 0.5–1.1)
EOSINOPHIL # BLD AUTO: 285 CELLS/UL (ref 15–500)
EOSINOPHIL NFR BLD AUTO: 2.3 %
ERYTHROCYTE [DISTWIDTH] IN BLOOD BY AUTOMATED COUNT: 12.2 % (ref 11–15)
EST. AVERAGE GLUCOSE BLD GHB EST-MCNC: 108 (CALC)
EST. AVERAGE GLUCOSE BLD GHB EST-SCNC: 6 (CALC)
GLOBULIN SER CALC-MCNC: 2.9 G/DL (CALC) (ref 1.9–3.7)
GLUCOSE SERPL-MCNC: 93 MG/DL (ref 65–99)
HBA1C MFR BLD: 5.4 % OF TOTAL HGB
HCT VFR BLD AUTO: 43.7 % (ref 35–45)
HDLC SERPL-MCNC: 47 MG/DL
HGB BLD-MCNC: 14.3 G/DL (ref 11.7–15.5)
LDLC SERPL CALC-MCNC: 141 MG/DL (CALC)
LYMPHOCYTES # BLD AUTO: 2170 CELLS/UL (ref 850–3900)
LYMPHOCYTES NFR BLD AUTO: 17.5 %
MCH RBC QN AUTO: 30.4 PG (ref 27–33)
MCHC RBC AUTO-ENTMCNC: 32.7 G/DL (ref 32–36)
MCV RBC AUTO: 92.8 FL (ref 80–100)
MONOCYTES # BLD AUTO: 831 CELLS/UL (ref 200–950)
MONOCYTES NFR BLD AUTO: 6.7 %
NEUTROPHILS # BLD AUTO: 9052 CELLS/UL (ref 1500–7800)
NEUTROPHILS NFR BLD AUTO: 73 %
NONHDLC SERPL-MCNC: 175 MG/DL (CALC)
PLATELET # BLD AUTO: 495 THOUSAND/UL (ref 140–400)
PMV BLD REES-ECKER: 9.6 FL (ref 7.5–12.5)
POTASSIUM SERPL-SCNC: 4.1 MMOL/L (ref 3.5–5.3)
PROT SERPL-MCNC: 6.9 G/DL (ref 6.1–8.1)
RBC # BLD AUTO: 4.71 MILLION/UL (ref 3.8–5.1)
SL AMB EGFR AFRICAN AMERICAN: 84 ML/MIN/1.73M2
SL AMB EGFR NON AFRICAN AMERICAN: 73 ML/MIN/1.73M2
SODIUM SERPL-SCNC: 136 MMOL/L (ref 135–146)
TRIGL SERPL-MCNC: 199 MG/DL
TSH SERPL-ACNC: 2.19 MIU/L
WBC # BLD AUTO: 12.4 THOUSAND/UL (ref 3.8–10.8)

## 2021-04-13 DIAGNOSIS — Z23 ENCOUNTER FOR IMMUNIZATION: ICD-10-CM

## 2021-05-03 ENCOUNTER — OFFICE VISIT (OUTPATIENT)
Dept: FAMILY MEDICINE CLINIC | Facility: CLINIC | Age: 46
End: 2021-05-03
Payer: COMMERCIAL

## 2021-05-03 VITALS
OXYGEN SATURATION: 98 % | BODY MASS INDEX: 28.73 KG/M2 | HEART RATE: 99 BPM | WEIGHT: 194 LBS | DIASTOLIC BLOOD PRESSURE: 84 MMHG | SYSTOLIC BLOOD PRESSURE: 130 MMHG | RESPIRATION RATE: 16 BRPM | TEMPERATURE: 99 F | HEIGHT: 69 IN

## 2021-05-03 DIAGNOSIS — R73.09 ABNORMAL BLOOD SUGAR: ICD-10-CM

## 2021-05-03 DIAGNOSIS — I10 ESSENTIAL HYPERTENSION: ICD-10-CM

## 2021-05-03 DIAGNOSIS — Z00.00 WELL ADULT EXAM: Primary | ICD-10-CM

## 2021-05-03 DIAGNOSIS — E78.49 ESSENTIAL FAMILIAL HYPERLIPIDEMIA: ICD-10-CM

## 2021-05-03 DIAGNOSIS — D72.829 LEUKOCYTOSIS, UNSPECIFIED TYPE: ICD-10-CM

## 2021-05-03 DIAGNOSIS — I10 BENIGN HYPERTENSION: ICD-10-CM

## 2021-05-03 DIAGNOSIS — F20.9 SCHIZOPHRENIA, UNSPECIFIED TYPE (HCC): ICD-10-CM

## 2021-05-03 PROCEDURE — 99396 PREV VISIT EST AGE 40-64: CPT | Performed by: FAMILY MEDICINE

## 2021-05-03 RX ORDER — AMLODIPINE BESYLATE 10 MG/1
10 TABLET ORAL DAILY
Qty: 90 TABLET | Refills: 1 | Status: SHIPPED | OUTPATIENT
Start: 2021-05-03 | End: 2021-12-01

## 2021-05-03 NOTE — ASSESSMENT & PLAN NOTE
Blood sugar from 3/30 was 93 with A1c 5 4 percent  Continue reduced carb diet exercise    Will continue monitor

## 2021-05-03 NOTE — ASSESSMENT & PLAN NOTE
Patient presents for 48year-old physical examination today  Patient still dealing with ongoing mood disorders with her psychiatrist   She states that her medications make her tired so she takes considerable amount of OTC Benadryl to combat this  Otherwise doing well  Patient intolerant red yeast rice for cholesterol  Doing well on amlodipine for blood pressure  Patient had labs done on March 30th   exam today unremarkable  Labs from March 30th reviewed with patient today    Patient is due for mammogram    Patient wants to get Rx through her gynecologist   Patient will be scheduling COVID shot in near future

## 2021-05-03 NOTE — PROGRESS NOTES
[facility]      NAME: Johnathan Mcintosh  AGE: 48 y o  SEX: female  : 1975   MRN: [id number]    DATE: 5/3/2021  TIME: 5:25 PM    Assessment and Plan     Problem List Items Addressed This Visit     Benign hypertension      Controlled on amlodipine 10  Med was refilled today         Relevant Medications    amLODIPine (NORVASC) 10 mg tablet    Essential familial hyperlipidemia      Cholesterol from  222/141 with triglycerides normal 99  Patient intolerant of red rice yeast   Will recommend reduced animal fat diet and exercise  Will continue to monitor         Abnormal blood sugar      Blood sugar from 3/30 was 93 with A1c 5 4 percent  Continue reduced carb diet exercise  Will continue monitor         Schizophrenia ([facility] )      Depression score 21 today  Patient being monitored by psychiatrist   Rosalva Torres taking Effexor, Stelazine, and Seroquel  Patient also takes Benadryl to combat these sedative properties of the other medications  I warned her against doing this  She states that her psychiatrist is aware and is working with her on weaning down to a reasonable dosage         Leukocytosis      Longstanding history of mild leukocytosis and high platelet count  Patient states it is from the Benadryl she takes on a daily basis  Will refer to hematology for 2nd opinion         Relevant Orders    Ambulatory referral to Hematology / Oncology    CBC and differential    Well adult exam - Primary      Patient presents for 58-year-old physical examination today  Patient still dealing with ongoing mood disorders with her psychiatrist   She states that her medications make her tired so she takes considerable amount of OTC Benadryl to combat this  Otherwise doing well  Patient intolerant red yeast rice for cholesterol  Doing well on amlodipine for blood pressure  Patient had labs done on    exam today unremarkable  Labs from  reviewed with patient today    Patient is due for mammogram    Patient wants to get Rx through her gynecologist   Patient will be scheduling COVID shot in near future           Other Visit Diagnoses     Essential hypertension        Relevant Medications    amLODIPine (NORVASC) 10 mg tablet         labs from March 30th reviewed      Return to office in: 6 mos,     Chief Complaint     Chief Complaint   Patient presents with    Physical Exam       History of Present Illness      Patient presents for 78-year-old physical examination today  Patient still dealing with ongoing mood disorders with her psychiatrist   She states that her medications make her tired so she takes considerable amount of OTC Benadryl to combat this  Otherwise doing well  Patient intolerant red yeast rice for cholesterol  Doing well on amlodipine for blood pressure  Patient had labs done on March 30th      The following portions of the patient's history were reviewed and updated as appropriate: allergies, current medications, past family history, past medical history, past social history, past surgical history and problem list     Review of Systems   Review of Systems   Respiratory: Negative  Cardiovascular: Negative  Gastrointestinal: Negative  Genitourinary: Negative  Active Problem List     Patient Active Problem List   Diagnosis    Benign hypertension    Essential familial hyperlipidemia    Abnormal blood sugar    Schizophrenia (HCC)    Chest tightness    Anxiety    Leukocytosis    Well adult exam       Objective   /84 (BP Location: Left arm, Patient Position: Sitting, Cuff Size: Large)   Pulse 99   Temp 99 °F (37 2 °C) (Tympanic)   Resp 16   Ht 5' 8 9" (1 75 m)   Wt 88 kg (194 lb)   SpO2 98%   BMI 28 73 kg/m²     Physical Exam  Cardiovascular:      Rate and Rhythm: Normal rate and regular rhythm  Heart sounds: Normal heart sounds        Comments: Carotids: no bruits  Ext: no edema  Pulmonary:      Effort: Pulmonary effort is normal  No respiratory distress  Breath sounds: No wheezing or rales  Psychiatric:         Behavior: Behavior normal          Thought Content:  Thought content normal          Pertinent Laboratory/Diagnostic Studies:  Labs March 30th    Current Medications     Current Outpatient Medications:     amLODIPine (NORVASC) 10 mg tablet, Take 1 tablet (10 mg total) by mouth daily, Disp: 90 tablet, Rfl: 1    Diphenhydramine-Phenylephrine 25-10 MG TABS, Take 25 mg by mouth daily, Disp: , Rfl:     influenza vaccine, quadrivalent (FLULAVAL) 0 5 ML ROCIO, Fluarix Quad [id number] (PF) 60 mcg (15 mcg x 4)/0 5 mL IM syringe  TO BE ADMINISTERED BY PHARMACIST FOR IMMUNIZATION, Disp: , Rfl:     Levonorgest-Eth Estrad 91-Day (Daysee) 0 15-0 03 &0 01 MG TABS, Take 1 tablet by mouth daily, Disp: 91 each, Rfl: 3    Multiple Vitamin (DAILY VALUE MULTIVITAMIN) TABS, Take by mouth daily, Disp: , Rfl:     QUEtiapine (SEROQUEL) 200 mg tablet, Take 1 tablet by mouth, Disp: , Rfl:     trifluoperazine (STELAZINE) 10 MG tablet, Take 1 capsule by mouth 2 (two) times a day, Disp: , Rfl:     venlafaxine (EFFEXOR XR) 150 mg 24 hr capsule, Take 1 capsule by mouth daily, Disp: , Rfl:     venlafaxine (EFFEXOR-XR) 75 mg 24 hr capsule, TAKE 1 CAP DAILY ALONG WITH 150MG FOR TOTAL OF 225MG DAILY, Disp: , Rfl: 5    Health Maintenance     Health Maintenance   Topic Date Due    Depression Follow-up Plan  Never done    HIV Screening  Never done    COVID-19 Vaccine (1) Never done    BMI: Followup Plan  Never done    DTaP,Tdap,and Td Vaccines (1 - Tdap) Never done    Cervical Cancer Screening  06/11/2018    MAMMOGRAM  07/10/2018    Annual Physical  05/14/2019    Depression Screening PHQ  05/03/2022    BMI: Adult  05/03/2022    Influenza Vaccine  Completed    Pneumococcal Vaccine: Pediatrics (0 to 5 Years) and At-Risk Patients (6 to 59 Years)  Aged Out    HIB Vaccine  Aged Out    Hepatitis B Vaccine  Aged Out    IPV Vaccine  Aged Out    Hepatitis A Vaccine  Aged Out    Meningococcal ACWY Vaccine  Aged Out    HPV Vaccine  Aged Out     Immunization History   Administered Date(s) Administered    INFLUENZA 10/01/2015, 09/01/2016, 09/25/2016, 09/29/2017, 09/30/2017, 09/30/2020    Influenza Quadrivalent, 6-35 Months IM 09/01/2016, 09/30/2017    Influenza, seasonal, injectable 10/01/2015    Influenza, seasonal, injectable, preservative free 09/24/2015, 09/30/2018       DO [facility]

## 2021-05-03 NOTE — ASSESSMENT & PLAN NOTE
Cholesterol from March 30th 222/141 with triglycerides normal 99  Patient intolerant of red rice yeast   Will recommend reduced animal fat diet and exercise    Will continue to monitor

## 2021-05-03 NOTE — ASSESSMENT & PLAN NOTE
Longstanding history of mild leukocytosis and high platelet count  Patient states it is from the Benadryl she takes on a daily basis    Will refer to hematology for 2nd opinion

## 2021-05-05 ENCOUNTER — TELEPHONE (OUTPATIENT)
Dept: SURGICAL ONCOLOGY | Facility: CLINIC | Age: 46
End: 2021-05-05

## 2021-05-05 NOTE — TELEPHONE ENCOUNTER
New Patient Encounter    New Patient Intake Form   Patient Details:  Dot Bauer  1975  [id number]    Background Information:  [address] starts by opening a telephone encounter and gathering the following information   Who is calling to schedule? If not self, relationship to patient? self   Referring Provider Dr Christina Garber   What is the diagnosis? D72 829 (ICD-10-CM) - Leukocytosis, unspecified type   Is this diagnosis confirmed? Yes   When was the diagnosis? 4/2021   Is there a confirmed diagnosis from a biopsy/tissue reviewed by pathology? Were outside slides requested? NA   Is patient aware of diagnosis? Yes   Is there a personal history and what kind? Yes   Is there a family history and what kind? No   Reason for visit? New Diagnosis   Have you had any imaging or labs done? If so: when, where? yes     Are records in [facility]? yes   If patient has a prior history of breast cancer were old records obtained? No   Was the patient told to bring a disk? No   Does the patient smoke or Vape? No   If yes, how many packs or cartridges per day? Scheduling Information:   Preferred [place]:  [place]     Are there any dates/time the patient cannot be seen? Miscellaneous:  Pt also states that he was looking at her elevated platelets   After completing the above information, please route to Financial Counselor and the appropriate Nurse Navigator for review

## 2021-06-25 DIAGNOSIS — Z30.41 SURVEILLANCE FOR BIRTH CONTROL, ORAL CONTRACEPTIVES: ICD-10-CM

## 2021-06-25 RX ORDER — LEVONORGESTREL / ETHINYL ESTRADIOL AND ETHINYL ESTRADIOL 150-30(84)
KIT ORAL
Qty: 91 TABLET | Refills: 3 | OUTPATIENT
Start: 2021-06-25

## 2021-07-01 NOTE — TELEPHONE ENCOUNTER
Left detailed message on machine to return our call to schedule yearly exam, we will not be able to fill her rx until then

## 2021-09-07 ENCOUNTER — OFFICE VISIT (OUTPATIENT)
Dept: FAMILY MEDICINE CLINIC | Facility: CLINIC | Age: 46
End: 2021-09-07
Payer: COMMERCIAL

## 2021-09-07 VITALS
HEIGHT: 69 IN | HEART RATE: 100 BPM | TEMPERATURE: 97.5 F | DIASTOLIC BLOOD PRESSURE: 88 MMHG | OXYGEN SATURATION: 98 % | WEIGHT: 192.8 LBS | RESPIRATION RATE: 17 BRPM | SYSTOLIC BLOOD PRESSURE: 134 MMHG | BODY MASS INDEX: 28.56 KG/M2

## 2021-09-07 DIAGNOSIS — M79.89 PAIN AND SWELLING OF LOWER EXTREMITY, RIGHT: Primary | ICD-10-CM

## 2021-09-07 DIAGNOSIS — M79.604 PAIN AND SWELLING OF LOWER EXTREMITY, RIGHT: Primary | ICD-10-CM

## 2021-09-07 PROCEDURE — 3008F BODY MASS INDEX DOCD: CPT | Performed by: FAMILY MEDICINE

## 2021-09-07 PROCEDURE — 1036F TOBACCO NON-USER: CPT | Performed by: FAMILY MEDICINE

## 2021-09-07 PROCEDURE — 99214 OFFICE O/P EST MOD 30 MIN: CPT | Performed by: FAMILY MEDICINE

## 2021-09-07 NOTE — PROGRESS NOTES
Assessment/Plan:   1  Pain and swelling of lower extremity, right    Reviewed patient's symptoms today  At this time, her exam today appeared to show mild swelling in her right lower extremity  She denies any pain in her calf or lower leg  Given her  Pain and swelling, will send patient for stat lower extremity venous duplex  Will call her with  Her results  - VAS lower limb venous duplex study, unilateral/limited; Future           There are no diagnoses linked to this encounter  Subjective:       Chief Complaint   Patient presents with    Leg Problem     discoloration back of R knee and pain for about 1 month       Patient ID: Hema Santillan is a 48 y o  female  Presents today with a CC of right knee pain and swelling patient has had this for the past month  She states the pain has been persistent over her knee  She has been having swelling in the knee area as well as into her distal thigh  She denies any trauma to this area  She also denies any long flights or car trips  She is concern for a possible blood clot  HPI    Review of Systems   Constitutional: Negative for activity change, chills, fatigue and fever  HENT: Negative for congestion, ear pain, sinus pressure and sore throat  Eyes: Negative for redness, itching and visual disturbance  Respiratory: Negative for cough and shortness of breath  Cardiovascular: Negative for chest pain and palpitations  Gastrointestinal: Negative for abdominal pain, diarrhea and nausea  Endocrine: Negative for cold intolerance and heat intolerance  Genitourinary: Negative for dysuria, flank pain and frequency  Musculoskeletal: Negative for arthralgias, back pain, gait problem and myalgias  Skin: Negative for color change  Allergic/Immunologic: Negative for environmental allergies  Neurological: Negative for dizziness, numbness and headaches  Psychiatric/Behavioral: Negative for behavioral problems and sleep disturbance         The following portions of the patient's history were reviewed and updated as appropriate : past family history, past medical history, past social history and past surgical history  Current Outpatient Medications:     amLODIPine (NORVASC) 10 mg tablet, Take 1 tablet (10 mg total) by mouth daily, Disp: 90 tablet, Rfl: 1    influenza vaccine, quadrivalent (FLULAVAL) 0 5 ML ROCIO, Fluarix Quad [id number] (PF) 60 mcg (15 mcg x 4)/0 5 mL IM syringe  TO BE ADMINISTERED BY PHARMACIST FOR IMMUNIZATION, Disp: , Rfl:     Multiple Vitamin (DAILY VALUE MULTIVITAMIN) TABS, Take by mouth daily, Disp: , Rfl:     QUEtiapine (SEROQUEL) 200 mg tablet, Take 1 tablet by mouth, Disp: , Rfl:     trifluoperazine (STELAZINE) 10 MG tablet, Take 1 capsule by mouth 2 (two) times a day, Disp: , Rfl:     venlafaxine (EFFEXOR XR) 150 mg 24 hr capsule, Take 1 capsule by mouth daily, Disp: , Rfl:     venlafaxine (EFFEXOR-XR) 75 mg 24 hr capsule, TAKE 1 CAP DAILY ALONG WITH 150MG FOR TOTAL OF 225MG DAILY, Disp: , Rfl: 5    Diphenhydramine-Phenylephrine 25-10 MG TABS, Take 25 mg by mouth daily (Patient not taking: Reported on 9/7/2021), Disp: , Rfl:     Levonorgest-Eth Estrad 91-Day (Daysee) 0 15-0 03 &0 01 MG TABS, Take 1 tablet by mouth daily (Patient not taking: Reported on 9/7/2021), Disp: 91 each, Rfl: 3         Objective:         Vitals:    09/07/21 1532   BP: 134/88   BP Location: Right arm   Patient Position: Sitting   Cuff Size: Adult   Pulse: 100   Resp: 17   Temp: 97 5 °F (36 4 °C)   SpO2: 98%   Weight: 87 5 kg (192 lb 12 8 oz)   Height: 5' 9" (1 753 m)     Physical Exam  Vitals reviewed  Constitutional:       Appearance: She is well-developed  HENT:      Head: Normocephalic and atraumatic  Nose: Nose normal       Mouth/Throat:      Pharynx: No oropharyngeal exudate  Eyes:      General: No scleral icterus  Right eye: No discharge  Left eye: No discharge        Pupils: Pupils are equal, round, and reactive to light    Neck:      Trachea: No tracheal deviation  Cardiovascular:      Rate and Rhythm: Normal rate and regular rhythm  Pulses:           Dorsalis pedis pulses are 2+ on the right side and 2+ on the left side  Posterior tibial pulses are 2+ on the right side and 2+ on the left side  Heart sounds: Normal heart sounds  No murmur heard  No friction rub  No gallop  Pulmonary:      Effort: Pulmonary effort is normal  No respiratory distress  Breath sounds: Normal breath sounds  No wheezing or rales  Abdominal:      General: Bowel sounds are normal  There is no distension  Palpations: Abdomen is soft  Tenderness: There is no abdominal tenderness  There is no guarding or rebound  Musculoskeletal:         General: Normal range of motion  Cervical back: Normal range of motion and neck supple  Lymphadenopathy:      Head:      Right side of head: No submental or submandibular adenopathy  Left side of head: No submental or submandibular adenopathy  Cervical: No cervical adenopathy  Right cervical: No superficial, deep or posterior cervical adenopathy  Left cervical: No superficial, deep or posterior cervical adenopathy  Skin:     General: Skin is warm and dry  Findings: No erythema  Neurological:      Mental Status: She is alert and oriented to person, place, and time  Cranial Nerves: No cranial nerve deficit  Sensory: No sensory deficit  Psychiatric:         Mood and Affect: Mood is not anxious or depressed  Speech: Speech normal          Behavior: Behavior normal          Thought Content:  Thought content normal          Judgment: Judgment normal

## 2021-09-08 ENCOUNTER — HOSPITAL ENCOUNTER (OUTPATIENT)
Dept: NON INVASIVE DIAGNOSTICS | Facility: HOSPITAL | Age: 46
Discharge: HOME | End: 2021-09-08
Payer: COMMERCIAL

## 2021-09-08 ENCOUNTER — TELEPHONE (OUTPATIENT)
Dept: FAMILY MEDICINE CLINIC | Facility: CLINIC | Age: 46
End: 2021-09-08

## 2021-09-08 DIAGNOSIS — M79.604 PAIN AND SWELLING OF LOWER EXTREMITY, RIGHT: ICD-10-CM

## 2021-09-08 DIAGNOSIS — M79.89 PAIN AND SWELLING OF LOWER EXTREMITY, RIGHT: ICD-10-CM

## 2021-09-08 PROCEDURE — 93971 EXTREMITY STUDY: CPT | Performed by: SURGERY

## 2021-09-08 PROCEDURE — 93971 EXTREMITY STUDY: CPT

## 2021-09-08 NOTE — TELEPHONE ENCOUNTER
Pt called had stat venous duplex today at 10a results reviewed by dr Lady Hightower while pt was on the phone with me advised her no dvt possible strain take ibu 3x a day every 6 hrs if no improvement f/u with dr Taryn Neff

## 2021-11-04 ENCOUNTER — OFFICE VISIT (OUTPATIENT)
Dept: FAMILY MEDICINE CLINIC | Facility: CLINIC | Age: 46
End: 2021-11-04
Payer: COMMERCIAL

## 2021-11-04 VITALS
TEMPERATURE: 97.9 F | SYSTOLIC BLOOD PRESSURE: 130 MMHG | DIASTOLIC BLOOD PRESSURE: 80 MMHG | HEART RATE: 87 BPM | RESPIRATION RATE: 16 BRPM | BODY MASS INDEX: 29.18 KG/M2 | OXYGEN SATURATION: 96 % | HEIGHT: 69 IN | WEIGHT: 197 LBS

## 2021-11-04 DIAGNOSIS — M54.31 SCIATICA OF RIGHT SIDE: Primary | ICD-10-CM

## 2021-11-04 DIAGNOSIS — G57.01 PIRIFORMIS SYNDROME OF RIGHT SIDE: ICD-10-CM

## 2021-11-04 PROCEDURE — 99213 OFFICE O/P EST LOW 20 MIN: CPT | Performed by: FAMILY MEDICINE

## 2021-11-04 PROCEDURE — 3725F SCREEN DEPRESSION PERFORMED: CPT | Performed by: FAMILY MEDICINE

## 2021-11-09 ENCOUNTER — APPOINTMENT (OUTPATIENT)
Dept: RADIOLOGY | Facility: CLINIC | Age: 46
End: 2021-11-09
Payer: COMMERCIAL

## 2021-11-09 DIAGNOSIS — G57.01 PIRIFORMIS SYNDROME OF RIGHT SIDE: ICD-10-CM

## 2021-11-09 DIAGNOSIS — M54.31 SCIATICA OF RIGHT SIDE: ICD-10-CM

## 2021-11-09 PROCEDURE — 72110 X-RAY EXAM L-2 SPINE 4/>VWS: CPT

## 2021-11-12 LAB
BASOPHILS # BLD AUTO: 77 CELLS/UL (ref 0–200)
BASOPHILS NFR BLD AUTO: 0.6 %
EOSINOPHIL # BLD AUTO: 361 CELLS/UL (ref 15–500)
EOSINOPHIL NFR BLD AUTO: 2.8 %
ERYTHROCYTE [DISTWIDTH] IN BLOOD BY AUTOMATED COUNT: 12.2 % (ref 11–15)
HCT VFR BLD AUTO: 41.3 % (ref 35–45)
HGB BLD-MCNC: 13.7 G/DL (ref 11.7–15.5)
LYMPHOCYTES # BLD AUTO: 2516 CELLS/UL (ref 850–3900)
LYMPHOCYTES NFR BLD AUTO: 19.5 %
MCH RBC QN AUTO: 29.8 PG (ref 27–33)
MCHC RBC AUTO-ENTMCNC: 33.2 G/DL (ref 32–36)
MCV RBC AUTO: 89.8 FL (ref 80–100)
MONOCYTES # BLD AUTO: 890 CELLS/UL (ref 200–950)
MONOCYTES NFR BLD AUTO: 6.9 %
NEUTROPHILS # BLD AUTO: 9056 CELLS/UL (ref 1500–7800)
NEUTROPHILS NFR BLD AUTO: 70.2 %
PLATELET # BLD AUTO: 488 THOUSAND/UL (ref 140–400)
PMV BLD REES-ECKER: 9.9 FL (ref 7.5–12.5)
RBC # BLD AUTO: 4.6 MILLION/UL (ref 3.8–5.1)
WBC # BLD AUTO: 12.9 THOUSAND/UL (ref 3.8–10.8)

## 2021-11-15 ENCOUNTER — OFFICE VISIT (OUTPATIENT)
Dept: FAMILY MEDICINE CLINIC | Facility: CLINIC | Age: 46
End: 2021-11-15
Payer: COMMERCIAL

## 2021-11-15 VITALS
SYSTOLIC BLOOD PRESSURE: 138 MMHG | WEIGHT: 196 LBS | TEMPERATURE: 97.5 F | RESPIRATION RATE: 16 BRPM | HEART RATE: 99 BPM | HEIGHT: 69 IN | DIASTOLIC BLOOD PRESSURE: 84 MMHG | OXYGEN SATURATION: 97 % | BODY MASS INDEX: 29.03 KG/M2

## 2021-11-15 DIAGNOSIS — F20.9 SCHIZOPHRENIA, UNSPECIFIED TYPE (HCC): ICD-10-CM

## 2021-11-15 DIAGNOSIS — R73.09 ABNORMAL BLOOD SUGAR: Primary | ICD-10-CM

## 2021-11-15 DIAGNOSIS — M25.551 RIGHT HIP PAIN: ICD-10-CM

## 2021-11-15 DIAGNOSIS — M54.31 SCIATICA, RIGHT SIDE: ICD-10-CM

## 2021-11-15 DIAGNOSIS — D72.829 LEUKOCYTOSIS, UNSPECIFIED TYPE: ICD-10-CM

## 2021-11-15 DIAGNOSIS — E78.49 ESSENTIAL FAMILIAL HYPERLIPIDEMIA: ICD-10-CM

## 2021-11-15 DIAGNOSIS — I10 BENIGN HYPERTENSION: ICD-10-CM

## 2021-11-15 PROCEDURE — 3008F BODY MASS INDEX DOCD: CPT | Performed by: FAMILY MEDICINE

## 2021-11-15 PROCEDURE — 1036F TOBACCO NON-USER: CPT | Performed by: FAMILY MEDICINE

## 2021-11-15 PROCEDURE — 99214 OFFICE O/P EST MOD 30 MIN: CPT | Performed by: FAMILY MEDICINE

## 2021-11-16 ENCOUNTER — APPOINTMENT (OUTPATIENT)
Dept: RADIOLOGY | Facility: CLINIC | Age: 46
End: 2021-11-16
Payer: COMMERCIAL

## 2021-11-16 DIAGNOSIS — M25.551 RIGHT HIP PAIN: ICD-10-CM

## 2021-11-16 PROCEDURE — 73502 X-RAY EXAM HIP UNI 2-3 VIEWS: CPT

## 2021-12-01 DIAGNOSIS — I10 ESSENTIAL HYPERTENSION: ICD-10-CM

## 2021-12-01 RX ORDER — AMLODIPINE BESYLATE 10 MG/1
TABLET ORAL
Qty: 90 TABLET | Refills: 1 | Status: SHIPPED | OUTPATIENT
Start: 2021-12-01 | End: 2022-06-06

## 2022-06-05 DIAGNOSIS — I10 ESSENTIAL HYPERTENSION: ICD-10-CM

## 2022-06-06 RX ORDER — AMLODIPINE BESYLATE 10 MG/1
TABLET ORAL
Qty: 90 TABLET | Refills: 1 | Status: SHIPPED | OUTPATIENT
Start: 2022-06-06

## 2022-06-20 NOTE — PROGRESS NOTES
Subjective      Siva Khan is a 50 y o  female who presents for annual exam   Last Pap smear 6/11/15 NILM  Last mammogram 7/10/17 Birads 2  Has not had CRC screening  The patient has no complaints today  The patient is sexually active  The patient is not taking hormone replacement therapy  Patient denies post-menopausal vaginal bleeding    The patient wears seatbelts: yes  The patient participates in regular exercise: no  The patient reports that there is not domestic violence in her life  Patient is interested in restarting OCPs  States she was on continuous OCPs for mood changes discussed options with psychiatrist in believe this will help control anxiety, depression and schizoaffective disorder  Patient is a nonsmoker  History is significant for hypertension well controlled with amlodipine  Menstrual History:  OB History        0    Para   0    Term   0       0    AB   0    Living   0       SAB   0    IAB   0    Ectopic   0    Multiple   0    Live Births   0                Menarche age: 13   Patient's last menstrual period was 2022  Period Cycle (Days):  (monthly)  Period Duration (Days): 3-7  Period Pattern: Regular  Menstrual Flow: Heavy, Moderate, Light  Dysmenorrhea: (!) Mild  Dysmenorrhea Symptoms: Other (Comment) (mood changes)    The following portions of the patient's history were reviewed and updated as appropriate: allergies, current medications, past family history, past medical history, past social history, past surgical history and problem list     Review of Systems  Pertinent items are noted in HPI       Objective      /84   Ht 5' 9" (1 753 m)   Wt 87 5 kg (193 lb)   LMP 2022   BMI 28 50 kg/m²     General:   alert and oriented, in no acute distress   Heart: regular rate and rhythm, S1, S2 normal, no murmur, click, rub or gallop   Lungs: clear to auscultation bilaterally   Abdomen: soft, non-tender, without masses or organomegaly and nondistended Vulva: normal, Bartholin's, Urethra, [place]'s normal   Vagina: normal mucosa, normal discharge, no palpable nodules   Cervix: no bleeding following Pap, no cervical motion tenderness and no lesions   Uterus: normal size, non-tender, normal shape and consistency   Adnexa: normal adnexa and no mass, fullness, tenderness   Breast:  breasts appear normal, no suspicious masses, no skin or nipple changes or axillary nodes, bilateral anchor scars from breast reduction  Assessment/Plan     Diagnoses and all orders for this visit:    Well woman exam with routine gynecological exam  -     Liquid-based pap, screening    Breast cancer screening by mammogram  -     Mammo screening bilateral w 3d & cad; Future    Colon cancer screening  -     Ambulatory Referral to Gastroenterology; Future    Encounter for initial prescription of contraceptive pills  -     levonorgestrel-ethinyl estradiol (Altavera) 0 15-30 MG-MCG per tablet; Take 1 tablet by mouth daily Skip placebo pills;  take continuous      Reviewed with patient all options for menstrual control including LARCs  Patient is adamant regarding wanted to restart same pill she was on in the past COCP  Reviewed increased risk of hypertension and estrogen use  Patient verbalized understanding declines progesterone only options  Signs and symptoms report reviewed in detail  Sunday start after next menses reviewed  Common side effects including irregular bleeding, breast tenderness and nausea reviewed  Aches reviewed  Written information provided  Encouraged to schedule appointment with GI for CRC screening; referral placed   Encouraged healthy diet, exercise and lifestyle  Encouraged follow-up with PCP and specialists as needed  Encouraged supplementation with calcium, vitamin-D and strength training exercises for good bone health  Encouraged social distancing, good hand hygiene, masking and avoiding crowds    Written information provided and COVID-19  Will call/ juwan  with results    RTO 3 months for COCP check     VBI-   BMI Counseling: Body mass index is 28 5 kg/m²  The BMI is above normal  Nutrition recommendations include reducing portion sizes, decreasing overall calorie intake and 3-5 servings of fruits/vegetables daily  Exercise recommendations include exercising 3-5 times per week, joining a gym and strength training exercises  Depression Screening Follow-up Plan: Patient's depression screening was positive with a PHQ-2 score of 5  Their PHQ-9 score was 20  Continue regular follow-up with their psychologist/therapist/psychiatrist who is managing their mental health condition(s)    Patient denies thoughts of self-harm or harm to others

## 2022-06-21 ENCOUNTER — OFFICE VISIT (OUTPATIENT)
Dept: OBGYN CLINIC | Facility: CLINIC | Age: 47
End: 2022-06-21
Payer: COMMERCIAL

## 2022-06-21 VITALS
HEIGHT: 69 IN | SYSTOLIC BLOOD PRESSURE: 120 MMHG | WEIGHT: 193 LBS | DIASTOLIC BLOOD PRESSURE: 84 MMHG | BODY MASS INDEX: 28.58 KG/M2

## 2022-06-21 DIAGNOSIS — Z30.011 ENCOUNTER FOR INITIAL PRESCRIPTION OF CONTRACEPTIVE PILLS: ICD-10-CM

## 2022-06-21 DIAGNOSIS — Z01.419 WELL WOMAN EXAM WITH ROUTINE GYNECOLOGICAL EXAM: Primary | ICD-10-CM

## 2022-06-21 DIAGNOSIS — Z12.11 COLON CANCER SCREENING: ICD-10-CM

## 2022-06-21 DIAGNOSIS — Z12.31 BREAST CANCER SCREENING BY MAMMOGRAM: ICD-10-CM

## 2022-06-21 PROBLEM — E78.00 HYPERCHOLESTEROLEMIA: Status: ACTIVE | Noted: 2017-05-25

## 2022-06-21 PROCEDURE — G0476 HPV COMBO ASSAY CA SCREEN: HCPCS | Performed by: NURSE PRACTITIONER

## 2022-06-21 PROCEDURE — G0145 SCR C/V CYTO,THINLAYER,RESCR: HCPCS | Performed by: NURSE PRACTITIONER

## 2022-06-21 PROCEDURE — S0610 ANNUAL GYNECOLOGICAL EXAMINA: HCPCS | Performed by: NURSE PRACTITIONER

## 2022-06-21 RX ORDER — LEVONORGESTREL AND ETHINYL ESTRADIOL 0.15-0.03
1 KIT ORAL DAILY
Qty: 30 TABLET | Refills: 4 | Status: SHIPPED | OUTPATIENT
Start: 2022-06-21

## 2022-06-22 LAB
HPV HR 12 DNA CVX QL NAA+PROBE: NEGATIVE
HPV16 DNA CVX QL NAA+PROBE: NEGATIVE
HPV18 DNA CVX QL NAA+PROBE: NEGATIVE

## 2022-06-27 LAB
LAB AP GYN PRIMARY INTERPRETATION: NORMAL
LAB AP LMP: NORMAL
Lab: NORMAL

## 2022-10-12 PROBLEM — Z00.00 WELL ADULT EXAM: Status: RESOLVED | Noted: 2021-05-03 | Resolved: 2022-10-12

## 2022-12-08 ENCOUNTER — OFFICE VISIT (OUTPATIENT)
Dept: FAMILY MEDICINE CLINIC | Facility: CLINIC | Age: 47
End: 2022-12-08

## 2022-12-08 VITALS
OXYGEN SATURATION: 98 % | HEART RATE: 99 BPM | TEMPERATURE: 97.7 F | RESPIRATION RATE: 16 BRPM | BODY MASS INDEX: 29.33 KG/M2 | SYSTOLIC BLOOD PRESSURE: 120 MMHG | DIASTOLIC BLOOD PRESSURE: 80 MMHG | HEIGHT: 69 IN | WEIGHT: 198 LBS

## 2022-12-08 DIAGNOSIS — D72.829 LEUKOCYTOSIS, UNSPECIFIED TYPE: ICD-10-CM

## 2022-12-08 DIAGNOSIS — Z23 NEED FOR VACCINATION: ICD-10-CM

## 2022-12-08 DIAGNOSIS — I10 BENIGN HYPERTENSION: ICD-10-CM

## 2022-12-08 DIAGNOSIS — R73.09 ABNORMAL BLOOD SUGAR: ICD-10-CM

## 2022-12-08 DIAGNOSIS — F20.9 SCHIZOPHRENIA, UNSPECIFIED TYPE (HCC): ICD-10-CM

## 2022-12-08 DIAGNOSIS — E78.49 ESSENTIAL FAMILIAL HYPERLIPIDEMIA: ICD-10-CM

## 2022-12-08 DIAGNOSIS — Z12.11 SCREEN FOR COLON CANCER: Primary | ICD-10-CM

## 2022-12-08 PROBLEM — E78.00 HYPERCHOLESTEROLEMIA: Status: RESOLVED | Noted: 2017-05-25 | Resolved: 2022-12-08

## 2022-12-08 PROBLEM — R07.89 CHEST TIGHTNESS: Status: RESOLVED | Noted: 2019-08-09 | Resolved: 2022-12-08

## 2022-12-08 RX ORDER — BUPROPION HYDROCHLORIDE 150 MG/1
150 TABLET ORAL EVERY MORNING
COMMUNITY
Start: 2022-11-29

## 2022-12-08 NOTE — ASSESSMENT & PLAN NOTE
Continue low-fat diet, exercise, and red yeast rice    We will recheck labs prior to next appointment

## 2022-12-08 NOTE — PROGRESS NOTES
Name: Anabell Arreola      : 1975      MRN: [id number]  Encounter Provider: Rosa Gamboa DO  Encounter Date: 2022   Encounter department: [address] [place] [address]     1  Screen for colon cancer  -     Cologuard    2  Benign hypertension  Assessment & Plan:  Blood pressure well controlled on amlodipine 10 mg daily  We will continue to monitor    Orders:  -     CBC and differential; Future; Expected date: 05/15/2023  -     TSH, 3rd generation with Free T4 reflex; Future; Expected date: 05/15/2023  -     CBC and differential  -     TSH, 3rd generation with Free T4 reflex    3  Essential familial hyperlipidemia  Assessment & Plan:  Continue low-fat diet, exercise, and red yeast rice  We will recheck labs prior to next appointment    Orders:  -     Lipid Panel with Direct LDL reflex; Future; Expected date: 05/15/2023  -     Lipid Panel with Direct LDL reflex    4  Abnormal blood sugar  Assessment & Plan:  Last A1c 5 2%  Continue reduced carb diet and exercise  We will recheck labs prior to next appointment    Orders:  -     Comprehensive metabolic panel; Future; Expected date: 05/15/2023  -     Hemoglobin A1c (w/out EAG); Future; Expected date: 05/15/2023  -     Comprehensive metabolic panel  -     Hemoglobin A1c (w/out EAG)    5  Schizophrenia, unspecified type (HCC)  Assessment & Plan:  Overall stable  Current meds include Wellbutrin, Effexor, Stelazine, and Seroquel  Continue follow-up with his psychiatrist as directed      6  Leukocytosis, unspecified type  Assessment & Plan:  History of leukocytosis (last WBC 12 9) and thrombocytosis (last platelets 031E)  Fortunately these have been stable  Patient denies any fevers, chills, night sweats  She was previously referred to hematology    Patient states she would like to recheck labs again prior to next appointment and agrees to go to specialist if still abnormal       7  Need for vaccination  -     influenza vaccine, quadrivalent, 0 5 mL, preservative-free, for adult and pediatric patients 6 mos+ (AFLURIA, FLUARIX, FLULAVAL, FLUZONE)      Flu shot given today  Order for Cologuard given  Reminded to get mammogram    6 months, yearly labs prior       Subjective     Patient presents for med check appointment today  Overall she is feeling well  Compliant with prescribed medications  Still being followed by psychiatrist    Review of Systems   Respiratory: Negative  Cardiovascular: Negative  Gastrointestinal: Negative  Genitourinary: Negative          Past Medical History:   Diagnosis Date   • Elevated blood pressure reading     last assessed: 16   • Herpes zoster     last assessed: 16     Past Surgical History:   Procedure Laterality Date   • REDUCTION MAMMAPLASTY     • WISDOM TOOTH EXTRACTION       Family History   Problem Relation Age of Onset   • Cervical cancer Mother    • Cancer Mother         abdominal   • Diabetes Mother    • Hyperlipidemia Mother    • Hypertension Mother    • Multiple sclerosis Mother    • Pancreatic cancer Mother    • Colon polyps Father    • Obesity Sister    • Cervical cancer Sister    • Diabetes Maternal Grandmother    • Skin cancer Maternal Grandmother    • Diabetes Maternal Grandfather    • Skin cancer Maternal Grandfather    • Cervical cancer Maternal Aunt    • Colon cancer Maternal Aunt    • Breast cancer Neg Hx    • Ovarian cancer Neg Hx      Social History     Socioeconomic History   • Marital status: /Civil Union     Spouse name: None   • Number of children: None   • Years of education: None   • Highest education level: None   Occupational History   • None   Tobacco Use   • Smoking status: Former     Types: Cigarettes     Quit date: 3/28/2010     Years since quittin 7   • Smokeless tobacco: Never   Vaping Use   • Vaping Use: Never used   Substance and Sexual Activity   • Alcohol use: No   • Drug use: Never   • Sexual activity: Yes     Partners: Male     Birth control/protection: None   Other Topics Concern   • None   Social History Narrative   • None     Social Determinants of Health     Financial Resource Strain: Not on file   Food Insecurity: Not on file   Transportation Needs: Not on file   Physical Activity: Not on file   Stress: Not on file   Social Connections: Not on file   Intimate Partner Violence: Not on file   Housing Stability: Not on file     Current Outpatient Medications on File Prior to Visit   Medication Sig   • buPROPion (WELLBUTRIN XL) 150 mg 24 hr tablet Take 150 mg by mouth every morning   • influenza vaccine, quadrivalent (FLULAVAL) 0 5 ML ROCIO Fluarix Quad 9914-9070 (PF) 60 mcg (15 mcg x 4)/0 5 mL IM syringe   TO BE ADMINISTERED BY PHARMACIST FOR IMMUNIZATION   • levonorgestrel-ethinyl estradiol (Altavera) 0 15-30 MG-MCG per tablet Take 1 tablet by mouth daily Skip placebo pills;  take continuous   • Multiple Vitamin (DAILY VALUE MULTIVITAMIN) TABS Take by mouth daily   • QUEtiapine (SEROquel) 200 mg tablet Take 1 tablet by mouth   • trifluoperazine (STELAZINE) 10 MG tablet Take 1 capsule by mouth 2 (two) times a day   • venlafaxine (EFFEXOR-XR) 150 mg 24 hr capsule Take 1 capsule by mouth daily   • venlafaxine (EFFEXOR-XR) 75 mg 24 hr capsule TAKE 1 CAP DAILY ALONG WITH 150MG FOR TOTAL OF 225MG DAILY     Allergies   Allergen Reactions   • Lamotrigine Shortness Of Breath   • Lisinopril Diarrhea   • Losartan Potassium-Hctz Rash     Immunization History   Administered Date(s) Administered   • INFLUENZA 10/01/2015, 09/01/2016, 09/25/2016, 09/29/2017, 09/30/2017, 09/30/2020, 10/02/2021   • Influenza Quadrivalent, 6-35 Months IM 09/01/2016, 09/30/2017   • Influenza, injectable, quadrivalent, preservative free 0 5 mL 12/08/2022   • Influenza, seasonal, injectable 10/01/2015   • Influenza, seasonal, injectable, preservative free 09/24/2015, 09/30/2018       Objective     /80 (BP Location: Left arm, Patient Position: Sitting, Cuff Size: Adult) Pulse 99   Temp 97 7 °F (36 5 °C) (Temporal)   Resp 16   Ht 5' 8 9" (1 75 m)   Wt 89 8 kg (198 lb)   SpO2 98%   BMI 29 33 kg/m²     Physical Exam  Cardiovascular:      Rate and Rhythm: Normal rate and regular rhythm  Heart sounds: Normal heart sounds  Comments: Carotids: no bruits  Ext: no edema  Pulmonary:      Effort: Pulmonary effort is normal  No respiratory distress  Breath sounds: No wheezing or rales  Psychiatric:         Behavior: Behavior normal          Thought Content:  Thought content normal        Radha Grumbling, DO

## 2022-12-08 NOTE — ASSESSMENT & PLAN NOTE
Last A1c 5 2%  Continue reduced carb diet and exercise    We will recheck labs prior to next appointment

## 2022-12-08 NOTE — ASSESSMENT & PLAN NOTE
History of leukocytosis (last WBC 12 9) and thrombocytosis (last platelets 454S)  Fortunately these have been stable  Patient denies any fevers, chills, night sweats  She was previously referred to hematology    Patient states she would like to recheck labs again prior to next appointment and agrees to go to specialist if still abnormal

## 2022-12-14 DIAGNOSIS — I10 ESSENTIAL HYPERTENSION: ICD-10-CM

## 2022-12-14 RX ORDER — AMLODIPINE BESYLATE 10 MG/1
10 TABLET ORAL DAILY
Qty: 90 TABLET | Refills: 0 | Status: SHIPPED | OUTPATIENT
Start: 2022-12-14

## 2022-12-28 DIAGNOSIS — I10 ESSENTIAL HYPERTENSION: ICD-10-CM

## 2022-12-28 RX ORDER — AMLODIPINE BESYLATE 10 MG/1
10 TABLET ORAL DAILY
Qty: 90 TABLET | Refills: 0 | Status: SHIPPED | OUTPATIENT
Start: 2022-12-28

## 2023-03-22 ENCOUNTER — TELEPHONE (OUTPATIENT)
Dept: ADMINISTRATIVE | Facility: OTHER | Age: 48
End: 2023-03-22

## 2023-03-22 NOTE — TELEPHONE ENCOUNTER
03/22/23 4:05 PM    The patient was called and a message was left to call the ordering provider's office regarding an open order  Thank you    Jodie Mtz [facility]

## 2023-06-17 DIAGNOSIS — I10 ESSENTIAL HYPERTENSION: ICD-10-CM

## 2023-06-18 NOTE — TELEPHONE ENCOUNTER
Medication Refill Request     Name Amlodipine   Dose/Frequency 10 mg/ one tab my mouth daily  Quantity 90 tab  Verified pharmacy   [x]  Verified ordering Provider   [x]  Does patient have enough for the next 3 days?  Yes [x] No []

## 2023-06-19 RX ORDER — AMLODIPINE BESYLATE 10 MG/1
10 TABLET ORAL DAILY
Qty: 90 TABLET | Refills: 0 | Status: SHIPPED | OUTPATIENT
Start: 2023-06-19

## 2023-08-01 ENCOUNTER — AMB VIDEO VISIT (OUTPATIENT)
Dept: OTHER | Facility: HOSPITAL | Age: 48
End: 2023-08-01

## 2023-08-01 DIAGNOSIS — J06.9 UPPER RESPIRATORY TRACT INFECTION, UNSPECIFIED TYPE: Primary | ICD-10-CM

## 2023-08-01 PROCEDURE — ECARE PR SL URGENT CARE VIRTUAL VISIT: Performed by: PHYSICIAN ASSISTANT

## 2023-08-01 RX ORDER — AMOXICILLIN AND CLAVULANATE POTASSIUM 875; 125 MG/1; MG/1
1 TABLET, FILM COATED ORAL 2 TIMES DAILY
Qty: 20 TABLET | Refills: 0 | Status: SHIPPED | OUTPATIENT
Start: 2023-08-01 | End: 2023-08-11

## 2023-08-01 NOTE — PATIENT INSTRUCTIONS
As discussed, sinus infections are typically viral and will get better on their own in 7-10 days. You should try symptomatic relief in the meantime with OTC (Claritin or Zyrtec), Afrin up to 3 days then switch to Flonase, and Sudafed. You can also try sinus rinses with a neti pot or nasal lavage (be sure to use distilled water.) If your symptoms do not improve after 7-10 days, you may need antibiotics because it is more likely to be bacterial at that point. Follow-up with your primary care physician for recheck in 2-3 business days. Go to the ER for sudden severe headache, high fevers, change in vision, seizure activity or anything else that is concerning.

## 2023-08-01 NOTE — PROGRESS NOTES
Video Visit - Shamar Cedeno 46 y.o. female MRN: [id number]    REQUIRED DOCUMENTATION:         1. This service was provided via [employer]. 2. Provider located at [facility]  [address] [facility] [address] [phone]. 3. [facility] provider: Ivy Price PA-C.  4. Identify all parties in room with patient during [facility] visit:  significant other-permission granted  5. After connecting through [employer], patient was identified by name and date of birth. Patient was then informed that this was a Telemedicine visit and that the exam was being conducted confidentially over secure lines. My office door was closed. No one else was in the room. Patient acknowledged consent and understanding of privacy and security of the Telemedicine visit. I informed the patient that I have reviewed their record in Epic and presented the opportunity for them to ask any questions regarding the visit today. The patient agreed to participate. VITALS: Heart Rate: 80 BPM, Respiratory Rate: 18 RPM, Temperature 97.4° F, Blood Pressure Unavailable mmHg, Pulse Ox Unavailable % on RA    HPI  Pt reports a cold sx- cough occasionally productive, nasal congestion, rhinorrhea, fatigue x 1 week. Endorses sore throat from cough, yesterday was having ear pain. Sx not improving. Taking ibuprofen or mucinex without relief. Denies fever, SOB, sinus pain/pressure. COVID testing negative.  recently sick with similar sx. Physical Exam  Constitutional:       General: She is not in acute distress. Appearance: Normal appearance. She is not toxic-appearing. HENT:      Head: Normocephalic and atraumatic. Nose: No rhinorrhea. Comments: Sounds severely congested     Mouth/Throat:      Mouth: Mucous membranes are moist.   Eyes:      Conjunctiva/sclera: Conjunctivae normal.   Cardiovascular:      Rate and Rhythm: Normal rate. Pulmonary:      Effort: Pulmonary effort is normal. No respiratory distress. Breath sounds: No wheezing (no gross audible wheeze through computer). Musculoskeletal:      Cervical back: Normal range of motion. Skin:     Findings: No rash (on face or neck). Neurological:      Mental Status: She is alert. Cranial Nerves: No dysarthria or facial asymmetry. Psychiatric:         Mood and Affect: Mood normal.         Behavior: Behavior normal.         Diagnoses and all orders for this visit:    Upper respiratory tract infection, unspecified type  -     amoxicillin-clavulanate (AUGMENTIN) 875-125 mg per tablet; Take 1 tablet by mouth 2 (two) times a day for 10 days      Patient Instructions   As discussed, sinus infections are typically viral and will get better on their own in 7-10 days. You should try symptomatic relief in the meantime with OTC (Claritin or Zyrtec), Afrin up to 3 days then switch to Flonase, and Sudafed. You can also try sinus rinses with a neti pot or nasal lavage (be sure to use distilled water.) If your symptoms do not improve after 7-10 days, you may need antibiotics because it is more likely to be bacterial at that point. Follow-up with your primary care physician for recheck in 2-3 business days. Go to the ER for sudden severe headache, high fevers, change in vision, seizure activity or anything else that is concerning.

## 2023-08-01 NOTE — CARE ANYWHERE EVISITS
Visit Summary for Rishi Rockwell - Gender: Female - Date of Birth: [id number]  Date: [id number] - Duration: 9 minutes  Patient: Rishi Marin  Provider: Bertha Marlow PA-C    Patient Contact Information  Address  [place] [address]  [phone]    Visit Topics  Cold [Added By: Self - 2023-08-01]    Triage Questions   What is your current physical address in the event of a medical emergency? Answer []  Are you allergic to any medications? Answer []  Are you now or could you be pregnant? Answer []  Do you have any immune system compromise or chronic lung   disease? Answer []  Do you have any vulnerable family members in the home (infant, pregnant, cancer, elderly)? Answer []     Conversation Transcripts  [0A][0A] [Notification] You are connected with Bertha Marlow PA-C, Urgent Care Specialist.[0A][Notification] [facility] is located in [place]. [0A][Notification] [facility] has shared health history. Danyel Chiu .[0A]    Diagnosis  Acute upper respiratory infection, unspecified    Procedures  Value: [id number] Code: CPT-4 UNLISTED E&M SERVICE    Medications Prescribed    No prescriptions ordered    Electronically signed by: Annabelle Sharma(NPI [id number])

## 2023-08-27 DIAGNOSIS — I10 ESSENTIAL HYPERTENSION: ICD-10-CM

## 2023-08-28 RX ORDER — AMLODIPINE BESYLATE 10 MG/1
10 TABLET ORAL DAILY
Qty: 90 TABLET | Refills: 0 | Status: SHIPPED | OUTPATIENT
Start: 2023-08-28

## 2023-12-05 DIAGNOSIS — I10 ESSENTIAL HYPERTENSION: ICD-10-CM

## 2023-12-05 RX ORDER — AMLODIPINE BESYLATE 10 MG/1
10 TABLET ORAL DAILY
Qty: 90 TABLET | Refills: 0 | Status: SHIPPED | OUTPATIENT
Start: 2023-12-05

## 2023-12-05 NOTE — TELEPHONE ENCOUNTER
Patient called back and scheduled a med review and physical for Dr. Marylee Fothergill next available .

## 2024-01-22 ENCOUNTER — TELEPHONE (OUTPATIENT)
Age: 49
End: 2024-01-22

## 2024-01-22 DIAGNOSIS — Z11.59 NEED FOR HEPATITIS C SCREENING TEST: ICD-10-CM

## 2024-01-22 DIAGNOSIS — R73.09 ABNORMAL BLOOD SUGAR: ICD-10-CM

## 2024-01-22 DIAGNOSIS — E78.49 ESSENTIAL FAMILIAL HYPERLIPIDEMIA: ICD-10-CM

## 2024-01-22 DIAGNOSIS — I10 BENIGN HYPERTENSION: Primary | ICD-10-CM

## 2024-01-22 NOTE — TELEPHONE ENCOUNTER
Pt scheduled an physical and is request that the provider sent in lab orders to [facility] in [place]. Please review and contact pt back with any updates.

## 2024-02-20 DIAGNOSIS — I10 ESSENTIAL HYPERTENSION: ICD-10-CM

## 2024-02-20 RX ORDER — AMLODIPINE BESYLATE 10 MG/1
10 TABLET ORAL DAILY
Qty: 90 TABLET | Refills: 0 | Status: SHIPPED | OUTPATIENT
Start: 2024-02-20

## 2024-02-28 ENCOUNTER — TELEPHONE (OUTPATIENT)
Dept: FAMILY MEDICINE CLINIC | Facility: CLINIC | Age: 49
End: 2024-02-28

## 2024-02-29 ENCOUNTER — OFFICE VISIT (OUTPATIENT)
Dept: FAMILY MEDICINE CLINIC | Facility: CLINIC | Age: 49
End: 2024-02-29
Payer: COMMERCIAL

## 2024-02-29 VITALS
OXYGEN SATURATION: 98 % | TEMPERATURE: 99.1 F | DIASTOLIC BLOOD PRESSURE: 76 MMHG | HEART RATE: 95 BPM | WEIGHT: 197.4 LBS | HEIGHT: 69 IN | SYSTOLIC BLOOD PRESSURE: 132 MMHG | BODY MASS INDEX: 29.24 KG/M2

## 2024-02-29 DIAGNOSIS — Z11.59 NEED FOR HEPATITIS C SCREENING TEST: ICD-10-CM

## 2024-02-29 DIAGNOSIS — I10 BENIGN HYPERTENSION: Primary | ICD-10-CM

## 2024-02-29 DIAGNOSIS — Z13.31 POSITIVE DEPRESSION SCREENING: ICD-10-CM

## 2024-02-29 DIAGNOSIS — H91.93 HEARING PROBLEM OF BOTH EARS: ICD-10-CM

## 2024-02-29 DIAGNOSIS — E78.49 ESSENTIAL FAMILIAL HYPERLIPIDEMIA: ICD-10-CM

## 2024-02-29 DIAGNOSIS — F20.9 SCHIZOPHRENIA, UNSPECIFIED TYPE (HCC): ICD-10-CM

## 2024-02-29 DIAGNOSIS — R73.09 ABNORMAL BLOOD SUGAR: ICD-10-CM

## 2024-02-29 PROCEDURE — 99214 OFFICE O/P EST MOD 30 MIN: CPT | Performed by: FAMILY MEDICINE

## 2024-02-29 RX ORDER — VENLAFAXINE HYDROCHLORIDE 75 MG/1
75 CAPSULE, EXTENDED RELEASE ORAL DAILY
Start: 2024-02-29

## 2024-02-29 NOTE — ASSESSMENT & PLAN NOTE
Patient states her diet has not been very good lately due to stress.  Recommend reduced fat diet along with exercise.  She is overdue for labs.  Will place new orders today.

## 2024-02-29 NOTE — ASSESSMENT & PLAN NOTE
Patient complaining of hearing problems lately.  Canals are clear today.  TMs essentially normal.  Will send to ENT for audiometry.

## 2024-02-29 NOTE — ASSESSMENT & PLAN NOTE
(See schizoaffective disorder).  Patient being followed by psychiatrist.  I also gave her information regarding mental health counselors.

## 2024-02-29 NOTE — ASSESSMENT & PLAN NOTE
History of mildly elevated sugars.  She states her diet has not been very good lately due to stress.  I did encourage her to get fasting blood work done in near future (last labs on chart were 2022).  New lab orders placed today

## 2024-02-29 NOTE — PROGRESS NOTES
Name: Suad Marin      : 1975      MRN: [id number]  Encounter Provider: Kilo France DO  Encounter Date: 2024   Encounter department: [facility]    Assessment & Plan     1. Benign hypertension  Assessment & Plan:  Blood pressure reasonably controlled on amlodipine 10 mg daily.    Orders:  -     CBC and differential; Future  -     TSH, 3rd generation with Free T4 reflex; Future    2. Essential familial hyperlipidemia  Assessment & Plan:  Patient states her diet has not been very good lately due to stress.  Recommend reduced fat diet along with exercise.  She is overdue for labs.  Will place new orders today.    Orders:  -     Lipid Panel with Direct LDL reflex; Future    3. Abnormal blood sugar  Assessment & Plan:  History of mildly elevated sugars.  She states her diet has not been very good lately due to stress.  I did encourage her to get fasting blood work done in near future (last labs on chart were ).  New lab orders placed today    Orders:  -     Comprehensive metabolic panel; Future  -     Hemoglobin A1C; Future    4. Schizophrenia, unspecified type (HCC)  Assessment & Plan:  She has been more depressed lately.  In the past few months, both of her parents passed away, along with her cat.  She is still seeing psychiatrist monthly and compliant with all prescribed medications (including Wellbutrin, Effexor, Stelazine, and Seroquel)..  Denies any suicidal ideation.  I gave patient information today to contact a grief counselor.    Orders:  -     venlafaxine (EFFEXOR-XR) 75 mg 24 hr capsule; Take 1 capsule (75 mg total) by mouth daily    5. Hearing problem of both ears  Assessment & Plan:  Patient complaining of hearing problems lately.  Canals are clear today.  TMs essentially normal.  Will send to ENT for audiometry.    Orders:  -     Ambulatory Referral to Otolaryngology; Future    6. Need for hepatitis C screening test  -     Hepatitis C antibody; Future    7.  Positive depression screening  Assessment & Plan:  (See schizoaffective disorder).  Patient being followed by psychiatrist.  I also gave her information regarding mental health counselors.           Patient declines flu shot.    Lab orders given today.    3 months, PE, med recheck appointment  Subjective     Patient presents for med check appointment today.  She has been more depressed lately.  In the past few months, both of her parents passed away, along with her cat.  She is still seeing psychiatrist monthly and compliant with all prescribed medications.  Denies any suicidal ideation.      Review of Systems   HENT:  Positive for hearing loss.    Respiratory: Negative.     Cardiovascular: Negative.    Gastrointestinal: Negative.    Genitourinary: Negative.    Psychiatric/Behavioral:  Positive for dysphoric mood. Negative for suicidal ideas.        Past Medical History:   Diagnosis Date   • Anxiety    • Arthritis    • Clotting disorder (HCC)    • Depression    • Elevated blood pressure reading     last assessed: 1/22/16   • Headache(784.0)    • Herpes zoster     last assessed: 2/9/16   • HL (hearing loss)    • Hypertension    • Obesity    • Otitis media    • Substance abuse (HCC)    • Visual impairment      Past Surgical History:   Procedure Laterality Date   • REDUCTION MAMMAPLASTY     • WISDOM TOOTH EXTRACTION       Family History   Problem Relation Age of Onset   • Cervical cancer Mother    • Cancer Mother         abdominal   • Diabetes Mother    • Hyperlipidemia Mother    • Hypertension Mother    • Multiple sclerosis Mother    • Pancreatic cancer Mother    • Arthritis Mother    • Hearing loss Mother    • Vision loss Mother    • Colon polyps Father    • Alcohol abuse Father    • Substance Abuse Father    • Mental illness Father    • Cancer Father    • Psychiatric Illness Father    • Psychosis Father    • Schizophrenia Father    • Obesity Sister    • Cervical cancer Sister    • Diabetes Maternal Grandmother    •  Skin cancer Maternal Grandmother    • Coronary artery disease Maternal Grandmother    • COPD Maternal Grandmother    • Arthritis Maternal Grandmother    • Vision loss Maternal Grandmother    • Diabetes Maternal Grandfather    • Skin cancer Maternal Grandfather    • Arthritis Maternal Grandfather    • Hearing loss Maternal Grandfather    • Vision loss Maternal Grandfather    • Glaucoma Maternal Grandfather    • Cervical cancer Maternal Aunt    • Colon cancer Maternal Aunt    • Substance Abuse Maternal Aunt    • Cancer Maternal Aunt    • Drug abuse Maternal Aunt    • Alcohol abuse Maternal Uncle    • Substance Abuse Maternal Uncle    • Drug abuse Maternal Uncle    • Mental illness Sister    • Depression Sister    • Thyroid disease Sister    • Arthritis Sister    • Anxiety disorder Sister    • Bipolar disorder Sister    • Psychiatric Illness Sister    • Suicide Attempts Sister    • Colon cancer Maternal Aunt    • Cancer Maternal Aunt    • Cancer Maternal Aunt    • Breast cancer Neg Hx    • Ovarian cancer Neg Hx      Social History     Socioeconomic History   • Marital status: /Civil Union     Spouse name: None   • Number of children: None   • Years of education: None   • Highest education level: None   Occupational History   • None   Tobacco Use   • Smoking status: Former     Current packs/day: 0.00     Types: Cigarettes     Quit date: 3/28/2010     Years since quittin.9     Passive exposure: Never   • Smokeless tobacco: Never   Vaping Use   • Vaping status: Never Used   Substance and Sexual Activity   • Alcohol use: No   • Drug use: Never   • Sexual activity: Yes     Partners: Male     Birth control/protection: None   Other Topics Concern   • None   Social History Narrative   • None     Social Determinants of Health     Financial Resource Strain: Not on file   Food Insecurity: Not on file   Transportation Needs: Not on file   Physical Activity: Not on file   Stress: Not on file   Social Connections: Not on  "file   Intimate Partner Violence: Not on file   Housing Stability: Not on file     Current Outpatient Medications on File Prior to Visit   Medication Sig   • amLODIPine (NORVASC) 10 mg tablet TAKE 1 TABLET BY MOUTH EVERY DAY   • buPROPion (WELLBUTRIN XL) 150 mg 24 hr tablet Take 150 mg by mouth every morning   • Multiple Vitamin (DAILY VALUE MULTIVITAMIN) TABS Take by mouth daily   • QUEtiapine (SEROquel) 200 mg tablet Take 1 tablet by mouth   • trifluoperazine (STELAZINE) 10 MG tablet Take 1 capsule by mouth 2 (two) times a day   • venlafaxine (EFFEXOR-XR) 150 mg 24 hr capsule Take 1 capsule by mouth daily   • [DISCONTINUED] venlafaxine (EFFEXOR-XR) 75 mg 24 hr capsule TAKE 1 CAP DAILY ALONG WITH 150MG FOR TOTAL OF 225MG DAILY     Allergies   Allergen Reactions   • Lamotrigine Shortness Of Breath   • Lisinopril Diarrhea   • Losartan Potassium-Hctz Rash     Immunization History   Administered Date(s) Administered   • INFLUENZA 10/01/2015, 09/01/2016, 09/25/2016, 09/29/2017, 09/30/2017, 09/30/2020, 10/02/2021   • Influenza Quadrivalent, 6-35 Months IM 09/01/2016, 09/30/2017   • Influenza, injectable, quadrivalent, preservative free 0.5 mL 12/08/2022   • Influenza, seasonal, injectable 10/01/2015   • Influenza, seasonal, injectable, preservative free 09/24/2015, 09/30/2018       Objective     /76 (BP Location: Left arm, Patient Position: Sitting, Cuff Size: Adult)   Pulse 95   Temp 99.1 °F (37.3 °C)   Ht 5' 9\" (1.753 m)   Wt 89.5 kg (197 lb 6.4 oz)   LMP 02/01/2024 (Exact Date)   SpO2 98%   BMI 29.15 kg/m²     Physical Exam  HENT:      Right Ear: Tympanic membrane, ear canal and external ear normal.      Left Ear: Tympanic membrane, ear canal and external ear normal.   Cardiovascular:      Rate and Rhythm: Normal rate and regular rhythm.      Heart sounds: Normal heart sounds.      Comments: Carotids: no bruits  Ext: no edema  Pulmonary:      Effort: Pulmonary effort is normal. No respiratory distress.    "   Breath sounds: No wheezing or rales.   Psychiatric:         Behavior: Behavior normal.         Thought Content: Thought content normal.       Kilo France, DO

## 2024-02-29 NOTE — ASSESSMENT & PLAN NOTE
She has been more depressed lately.  In the past few months, both of her parents passed away, along with her cat.  She is still seeing psychiatrist monthly and compliant with all prescribed medications (including Wellbutrin, Effexor, Stelazine, and Seroquel)..  Denies any suicidal ideation.  I gave patient information today to contact a grief counselor.

## 2024-03-04 ENCOUNTER — TELEPHONE (OUTPATIENT)
Age: 49
End: 2024-03-04

## 2024-05-19 DIAGNOSIS — I10 ESSENTIAL HYPERTENSION: ICD-10-CM

## 2024-05-19 RX ORDER — AMLODIPINE BESYLATE 10 MG/1
10 TABLET ORAL DAILY
Qty: 90 TABLET | Refills: 1 | Status: SHIPPED | OUTPATIENT
Start: 2024-05-19

## 2024-11-03 DIAGNOSIS — I10 ESSENTIAL HYPERTENSION: ICD-10-CM

## 2024-11-05 RX ORDER — AMLODIPINE BESYLATE 10 MG/1
10 TABLET ORAL DAILY
Qty: 90 TABLET | Refills: 0 | Status: SHIPPED | OUTPATIENT
Start: 2024-11-05

## 2024-11-06 ENCOUNTER — OFFICE VISIT (OUTPATIENT)
Dept: FAMILY MEDICINE CLINIC | Facility: CLINIC | Age: 49
End: 2024-11-06
Payer: COMMERCIAL

## 2024-11-06 VITALS
OXYGEN SATURATION: 94 % | SYSTOLIC BLOOD PRESSURE: 130 MMHG | HEIGHT: 69 IN | TEMPERATURE: 98.2 F | DIASTOLIC BLOOD PRESSURE: 90 MMHG | HEART RATE: 100 BPM | WEIGHT: 198.6 LBS | BODY MASS INDEX: 29.41 KG/M2

## 2024-11-06 DIAGNOSIS — Z00.00 WELL ADULT EXAM: Primary | ICD-10-CM

## 2024-11-06 DIAGNOSIS — H91.93 HEARING PROBLEM OF BOTH EARS: ICD-10-CM

## 2024-11-06 DIAGNOSIS — I10 BENIGN HYPERTENSION: ICD-10-CM

## 2024-11-06 DIAGNOSIS — R73.09 ABNORMAL BLOOD SUGAR: ICD-10-CM

## 2024-11-06 DIAGNOSIS — R60.0 LOCALIZED EDEMA: ICD-10-CM

## 2024-11-06 DIAGNOSIS — Z12.11 SCREEN FOR COLON CANCER: ICD-10-CM

## 2024-11-06 DIAGNOSIS — E78.49 ESSENTIAL FAMILIAL HYPERLIPIDEMIA: ICD-10-CM

## 2024-11-06 DIAGNOSIS — F20.9 SCHIZOPHRENIA, UNSPECIFIED TYPE (HCC): ICD-10-CM

## 2024-11-06 DIAGNOSIS — Z12.31 ENCOUNTER FOR SCREENING MAMMOGRAM FOR MALIGNANT NEOPLASM OF BREAST: ICD-10-CM

## 2024-11-06 PROCEDURE — 99396 PREV VISIT EST AGE 40-64: CPT | Performed by: FAMILY MEDICINE

## 2024-11-06 RX ORDER — QUETIAPINE 300 MG/1
300 TABLET, FILM COATED, EXTENDED RELEASE ORAL
COMMUNITY
Start: 2024-11-04

## 2024-11-06 NOTE — ASSESSMENT & PLAN NOTE
Recommend continue heart healthy diet and regular exercise program.  Patient is due for labs in near future.  Will call with results  Orders:    Lipid Panel with Direct LDL reflex; Future    Lipid Panel with Direct LDL reflex

## 2024-11-06 NOTE — ASSESSMENT & PLAN NOTE
Continue reduced carb diet and exercise.  Patient is due for labs.  Will call with results  Orders:    Comprehensive metabolic panel; Future    Hemoglobin A1c (w/out EAG); Future    Comprehensive metabolic panel    Hemoglobin A1c (w/out EAG)

## 2024-11-06 NOTE — ASSESSMENT & PLAN NOTE
Blood pressure reasonably controlled.  Patient admits that she has had a considerable amount of salt in her diet recently.  I encouraged her to decrease this and start a formal exercise program.  Will continue to monitor  Orders:    CBC and differential; Future    TSH, 3rd generation with Free T4 reflex; Future    CBC and differential    TSH, 3rd generation with Free T4 reflex

## 2024-11-06 NOTE — PROGRESS NOTES
Ambulatory Visit  Name: Suad Marin      : 1975      MRN: [id number]  Encounter Provider: Kilo France DO  Encounter Date: 2024   Encounter department: [facility]    Assessment & Plan  Well adult exam  Patient presents for 49-year-old physical examination and med check appointment today.  Overall she is doing well.  Her diet has not been very good lately.  She is not engage in any formal exercise program.  She is a non-smoker.  Does not drink alcohol.  She consumes 3 cups of coffee a day.  She is compliant with prescribed medications.  Still being followed by psychiatry.  On examination, blood pressure 130/90.  Weight 198.  Remainder of exam was unremarkable.  Recommend continue to work on healthy diet and regular exercise program (at least 150 minutes aerobic exercise weekly).       Benign hypertension  Blood pressure reasonably controlled.  Patient admits that she has had a considerable amount of salt in her diet recently.  I encouraged her to decrease this and start a formal exercise program.  Will continue to monitor  Orders:    CBC and differential; Future    TSH, 3rd generation with Free T4 reflex; Future    CBC and differential    TSH, 3rd generation with Free T4 reflex    Essential familial hyperlipidemia  Recommend continue heart healthy diet and regular exercise program.  Patient is due for labs in near future.  Will call with results  Orders:    Lipid Panel with Direct LDL reflex; Future    Lipid Panel with Direct LDL reflex    Abnormal blood sugar  Continue reduced carb diet and exercise.  Patient is due for labs.  Will call with results  Orders:    Comprehensive metabolic panel; Future    Hemoglobin A1c (w/out EAG); Future    Comprehensive metabolic panel    Hemoglobin A1c (w/out EAG)    Schizophrenia, unspecified type (HCC)  Stable on current meds including Wellbutrin, Effexor, Stelazine, and Seroquel.  Continue regular follow-up with psychiatrist as directed        Hearing problem of both ears  Improved       Localized edema  Patient has noticed intermittent lower extremity edema lately, especially when she eats more salt.  Discussed sodium reduction in diet, increase exercise.  Also recommend purchasing OTC compression stockings.  Consider as needed furosemide if symptoms persist.       Encounter for screening mammogram for malignant neoplasm of breast    Orders:    Mammo screening bilateral w 3d and cad; Future    Screen for colon cancer    Orders:    Ambulatory Referral to Gastroenterology; Future         Order for colonoscopy placed  Order for mammogram placed    Patient declines flu shot today, but will consider  Recommend tetanus booster in near future  Consider Shingrix vaccination series    Patient due for routine fasting blood work.  New order placed today for [facility].  Will call with results    6 months      History of Present Illness     Patient presents for 49-year-old physical examination and med check appointment today.  Overall she is doing well.  Her diet has not been very good lately.  She is not engage in any formal exercise program.  She is a non-smoker.  Does not drink alcohol.  She consumes 3 cups of coffee a day.  She is compliant with prescribed medications.  Still being followed by psychiatry.      Review of Systems   Constitutional:  Negative for chills and fever.   HENT:  Negative for ear pain and sore throat.    Eyes:  Negative for pain and visual disturbance.   Respiratory:  Negative for cough and shortness of breath.    Cardiovascular:  Negative for chest pain and palpitations.   Gastrointestinal:  Negative for abdominal pain and vomiting.   Genitourinary:  Negative for dysuria and hematuria.   Musculoskeletal:  Negative for arthralgias and back pain.   Skin:  Negative for color change and rash.   Neurological:  Negative for seizures and syncope.   All other systems reviewed and are negative.    Past Medical History:   Diagnosis Date    Anxiety      Arthritis     Clotting disorder (HCC)     Depression     Elevated blood pressure reading     last assessed: 1/22/16    Headache(784.0)     Herpes zoster     last assessed: 2/9/16    HL (hearing loss)     Hypertension     Obesity     Otitis media     Substance abuse (HCC)     Visual impairment      Past Surgical History:   Procedure Laterality Date    REDUCTION MAMMAPLASTY      WISDOM TOOTH EXTRACTION       Family History   Problem Relation Age of Onset    Cervical cancer Mother     Cancer Mother         abdominal    Diabetes Mother     Hyperlipidemia Mother     Hypertension Mother     Multiple sclerosis Mother     Pancreatic cancer Mother     Arthritis Mother     Hearing loss Mother     Vision loss Mother     Colon polyps Father     Alcohol abuse Father     Substance Abuse Father     Mental illness Father     Cancer Father     Psychiatric Illness Father     Psychosis Father     Schizophrenia Father     Obesity Sister     Cervical cancer Sister     Diabetes Maternal Grandmother     Skin cancer Maternal Grandmother     Coronary artery disease Maternal Grandmother     COPD Maternal Grandmother     Arthritis Maternal Grandmother     Vision loss Maternal Grandmother     Diabetes Maternal Grandfather     Skin cancer Maternal Grandfather     Arthritis Maternal Grandfather     Hearing loss Maternal Grandfather     Vision loss Maternal Grandfather     Glaucoma Maternal Grandfather     Cervical cancer Maternal Aunt     Colon cancer Maternal Aunt     Substance Abuse Maternal Aunt     Cancer Maternal Aunt     Drug abuse Maternal Aunt     Alcohol abuse Maternal Uncle     Substance Abuse Maternal Uncle     Drug abuse Maternal Uncle     Mental illness Sister     Depression Sister     Thyroid disease Sister     Arthritis Sister     Anxiety disorder Sister     Bipolar disorder Sister     Psychiatric Illness Sister     Suicide Attempts Sister     Colon cancer Maternal Aunt     Cancer Maternal Aunt     Cancer Maternal Aunt      "Breast cancer Neg Hx     Ovarian cancer Neg Hx      Social History     Tobacco Use    Smoking status: Former     Current packs/day: 0.00     Types: Cigarettes     Quit date: 3/28/2010     Years since quittin.6     Passive exposure: Never    Smokeless tobacco: Never   Vaping Use    Vaping status: Never Used   Substance and Sexual Activity    Alcohol use: No    Drug use: Never    Sexual activity: Yes     Partners: Male     Birth control/protection: None     Current Outpatient Medications on File Prior to Visit   Medication Sig    amLODIPine (NORVASC) 10 mg tablet TAKE 1 TABLET BY MOUTH EVERY DAY    buPROPion (WELLBUTRIN XL) 150 mg 24 hr tablet Take 150 mg by mouth every morning    Multiple Vitamin (DAILY VALUE MULTIVITAMIN) TABS Take by mouth daily    QUEtiapine (SEROquel) 200 mg tablet Take 1 tablet by mouth    trifluoperazine (STELAZINE) 10 MG tablet Take 1 capsule by mouth 2 (two) times a day    venlafaxine (EFFEXOR-XR) 150 mg 24 hr capsule Take 1 capsule by mouth daily    venlafaxine (EFFEXOR-XR) 75 mg 24 hr capsule Take 1 capsule (75 mg total) by mouth daily    QUEtiapine (SEROquel XR) 300 mg 24 hr tablet Take 300 mg by mouth daily at bedtime (Patient not taking: Reported on 2024)     Allergies   Allergen Reactions    Lamotrigine Shortness Of Breath    Lisinopril Diarrhea    Losartan Potassium-Hctz Rash     Immunization History   Administered Date(s) Administered    INFLUENZA 10/01/2015, 2016, 2016, 2017, 2017, 2020, 10/02/2021    Influenza Quadrivalent, 6-35 Months IM 2016, 2017    Influenza, injectable, quadrivalent, preservative free 0.5 mL 2022    Influenza, seasonal, injectable 10/01/2015    Influenza, seasonal, injectable, preservative free 2015, 2018     Objective     /90 (BP Location: Left arm, Patient Position: Sitting, Cuff Size: Adult)   Pulse 100   Temp 98.2 °F (36.8 °C) (Temporal)   Ht 5' 9\" (1.753 m)   Wt 90.1 kg (198 lb " 9.6 oz)   SpO2 94%   BMI 29.33 kg/m²     Physical Exam  Vitals and nursing note reviewed.   Constitutional:       Appearance: She is well-developed.   HENT:      Head: Normocephalic and atraumatic.      Right Ear: External ear normal.      Left Ear: External ear normal.   Eyes:      Pupils: Pupils are equal, round, and reactive to light.   Cardiovascular:      Rate and Rhythm: Normal rate and regular rhythm.      Heart sounds: Normal heart sounds.   Pulmonary:      Effort: Pulmonary effort is normal.      Breath sounds: Normal breath sounds.   Abdominal:      General: Bowel sounds are normal.      Palpations: Abdomen is soft.   Musculoskeletal:      Cervical back: Normal range of motion and neck supple.   Neurological:      Mental Status: She is alert and oriented to person, place, and time.      Deep Tendon Reflexes: Reflexes are normal and symmetric.   Psychiatric:         Behavior: Behavior normal.         Thought Content: Thought content normal.         Judgment: Judgment normal.

## 2025-01-28 ENCOUNTER — TELEPHONE (OUTPATIENT)
Age: 50
End: 2025-01-28

## 2025-01-28 DIAGNOSIS — Z12.11 SCREEN FOR COLON CANCER: Primary | ICD-10-CM

## 2025-01-31 DIAGNOSIS — I10 ESSENTIAL HYPERTENSION: ICD-10-CM

## 2025-01-31 RX ORDER — AMLODIPINE BESYLATE 10 MG/1
10 TABLET ORAL DAILY
Qty: 90 TABLET | Refills: 1 | Status: SHIPPED | OUTPATIENT
Start: 2025-01-31

## 2025-02-28 ENCOUNTER — RESULTS FOLLOW-UP (OUTPATIENT)
Dept: FAMILY MEDICINE CLINIC | Facility: CLINIC | Age: 50
End: 2025-02-28

## 2025-02-28 LAB
CHOLEST SERPL-MCNC: 222 MG/DL
CHOLEST/HDLC SERPL: 4.4 (CALC)
HBA1C MFR BLD: 5.4 % OF TOTAL HGB
HDLC SERPL-MCNC: 51 MG/DL
LDLC SERPL CALC-MCNC: 137 MG/DL (CALC)
NONHDLC SERPL-MCNC: 171 MG/DL (CALC)
TRIGL SERPL-MCNC: 195 MG/DL

## 2025-03-31 ENCOUNTER — TELEPHONE (OUTPATIENT)
Dept: FAMILY MEDICINE CLINIC | Facility: CLINIC | Age: 50
End: 2025-03-31

## 2025-03-31 NOTE — TELEPHONE ENCOUNTER
Called pt and lvm in regards to appointment on 4/2/25 with Dr. France. Requesting that the pt brings in a physical copy of her photo ID to the appointment.

## 2025-04-02 ENCOUNTER — OFFICE VISIT (OUTPATIENT)
Dept: FAMILY MEDICINE CLINIC | Facility: CLINIC | Age: 50
End: 2025-04-02
Payer: COMMERCIAL

## 2025-04-02 VITALS
SYSTOLIC BLOOD PRESSURE: 124 MMHG | HEART RATE: 82 BPM | OXYGEN SATURATION: 98 % | WEIGHT: 201.8 LBS | DIASTOLIC BLOOD PRESSURE: 66 MMHG | TEMPERATURE: 98.5 F | BODY MASS INDEX: 29.8 KG/M2

## 2025-04-02 DIAGNOSIS — R06.02 SHORT OF BREATH ON EXERTION: ICD-10-CM

## 2025-04-02 DIAGNOSIS — R94.31 ABNORMAL EKG: ICD-10-CM

## 2025-04-02 DIAGNOSIS — Z13.31 POSITIVE DEPRESSION SCREENING: ICD-10-CM

## 2025-04-02 DIAGNOSIS — F20.9 SCHIZOPHRENIA, UNSPECIFIED TYPE (HCC): ICD-10-CM

## 2025-04-02 DIAGNOSIS — R05.2 SUBACUTE COUGH: Primary | ICD-10-CM

## 2025-04-02 DIAGNOSIS — R60.0 EDEMA OF LEFT FOOT: ICD-10-CM

## 2025-04-02 PROCEDURE — 99214 OFFICE O/P EST MOD 30 MIN: CPT | Performed by: FAMILY MEDICINE

## 2025-04-02 RX ORDER — TOPIRAMATE 50 MG/1
50 TABLET, FILM COATED ORAL DAILY
COMMUNITY

## 2025-04-02 RX ORDER — BUSPIRONE HYDROCHLORIDE 15 MG/1
1 TABLET ORAL 2 TIMES DAILY
COMMUNITY
Start: 2025-02-28

## 2025-04-02 NOTE — ASSESSMENT & PLAN NOTE
Patient states she recently had an EKG which she was told was abnormal.  I reviewed results in care everywhere.  Tracing is not available to view, but results states inferior infarct with cannot rule out anterior wall MI.  Previous EKG was 2019 was read as normal (again tracing was not available).  Patient denies any chest pain.  Will send for echocardiogram for clarification.  Orders:  •  Echo complete w/ contrast if indicated; Future

## 2025-04-02 NOTE — ASSESSMENT & PLAN NOTE
Patient complains of intermittent left foot edema.  This occurs a few days before her menstrual period and goes away immediately following.  Currently no edema.  Exam is unremarkable.  Distal pulses are intact.  No calf tenderness.  Offered trial of furosemide to be used on as needed basis.  Patient declines today.  Also recommend that she follow-up with her gynecologist

## 2025-04-02 NOTE — PROGRESS NOTES
Name: Suad Marin      : 1975      MRN: [id number]  Encounter Provider: Kilo France DO  Encounter Date: 2025   Encounter department: [facility]    Assessment & Plan  Subacute cough  Patient complains of 3-month history of intermittent dry tickle cough.  She does not feel sick.  Denies any fevers chills night sweats or weight loss.  She does have remote smoking history (quit in ).  On examination, lungs are clear.  Will send for chest x-ray.  If negative, I asked her to discuss with her psychiatrist possible medication as etiology for cough (she was recently placed on topiramate a few months ago).  Orders:  •  XR chest pa and lateral; Future    Edema of left foot  Patient complains of intermittent left foot edema.  This occurs a few days before her menstrual period and goes away immediately following.  Currently no edema.  Exam is unremarkable.  Distal pulses are intact.  No calf tenderness.  Offered trial of furosemide to be used on as needed basis.  Patient declines today.  Also recommend that she follow-up with her gynecologist       Abnormal EKG  Patient states she recently had an EKG which she was told was abnormal.  I reviewed results in care everywhere.  Tracing is not available to view, but results states inferior infarct with cannot rule out anterior wall MI.  Previous EKG was 2019 was read as normal (again tracing was not available).  Patient denies any chest pain.  Will send for echocardiogram for clarification.  Orders:  •  Echo complete w/ contrast if indicated; Future    Positive depression screening  Positive depression screening today.  Patient being followed by psychiatry on a regular basis       Schizophrenia, unspecified type (HCC)  Continue regular follow-up with psychiatry       Short of breath on exertion    Orders:  •  Echo complete w/ contrast if indicated; Future         History of Present Illness     Patient presents for a number of issues today.   Dry cough for the last 3 months, intermittent left foot pain occurring few days before her menses.  Questionable abnormal EKG.  Lastly, positive depression screen      Review of Systems   Respiratory:  Positive for cough.    Cardiovascular:  Positive for leg swelling.   Gastrointestinal: Negative.    Genitourinary: Negative.      Past Medical History:   Diagnosis Date   • Anxiety    • Arthritis    • Clotting disorder (HCC)    • Depression    • Elevated blood pressure reading     last assessed: 1/22/16   • Headache(784.0)    • Herpes zoster     last assessed: 2/9/16   • HL (hearing loss)    • Hypertension    • Obesity    • Otitis media    • Substance abuse (HCC)    • Visual impairment      Past Surgical History:   Procedure Laterality Date   • REDUCTION MAMMAPLASTY     • WISDOM TOOTH EXTRACTION       Family History   Problem Relation Age of Onset   • Cervical cancer Mother    • Cancer Mother         abdominal   • Diabetes Mother    • Hyperlipidemia Mother    • Hypertension Mother    • Multiple sclerosis Mother    • Pancreatic cancer Mother    • Arthritis Mother    • Hearing loss Mother    • Vision loss Mother    • Colon polyps Father    • Alcohol abuse Father    • Substance Abuse Father    • Mental illness Father    • Cancer Father    • Psychiatric Illness Father    • Psychosis Father    • Schizophrenia Father    • Obesity Sister    • Cervical cancer Sister    • Diabetes Maternal Grandmother    • Skin cancer Maternal Grandmother    • Coronary artery disease Maternal Grandmother    • COPD Maternal Grandmother    • Arthritis Maternal Grandmother    • Vision loss Maternal Grandmother    • Diabetes Maternal Grandfather    • Skin cancer Maternal Grandfather    • Arthritis Maternal Grandfather    • Hearing loss Maternal Grandfather    • Vision loss Maternal Grandfather    • Glaucoma Maternal Grandfather    • Cervical cancer Maternal Aunt    • Colon cancer Maternal Aunt    • Substance Abuse Maternal Aunt    • Cancer  Maternal Aunt    • Drug abuse Maternal Aunt    • Alcohol abuse Maternal Uncle    • Substance Abuse Maternal Uncle    • Drug abuse Maternal Uncle    • Mental illness Sister    • Depression Sister    • Thyroid disease Sister    • Arthritis Sister    • Anxiety disorder Sister    • Bipolar disorder Sister    • Psychiatric Illness Sister    • Suicide Attempts Sister    • Colon cancer Maternal Aunt    • Cancer Maternal Aunt    • Cancer Maternal Aunt    • Breast cancer Neg Hx    • Ovarian cancer Neg Hx      Social History     Tobacco Use   • Smoking status: Former     Current packs/day: 0.00     Types: Cigarettes     Quit date: 3/28/2010     Years since quitting: 15.0     Passive exposure: Current   • Smokeless tobacco: Never   Vaping Use   • Vaping status: Never Used   Substance and Sexual Activity   • Alcohol use: No   • Drug use: Never   • Sexual activity: Yes     Partners: Male     Birth control/protection: None     Current Outpatient Medications on File Prior to Visit   Medication Sig   • amLODIPine (NORVASC) 10 mg tablet TAKE 1 TABLET BY MOUTH EVERY DAY   • buPROPion (WELLBUTRIN XL) 150 mg 24 hr tablet Take 150 mg by mouth every morning   • busPIRone (BUSPAR) 15 mg tablet Take 1 tablet by mouth 2 (two) times a day   • Multiple Vitamin (DAILY VALUE MULTIVITAMIN) TABS Take by mouth daily   • QUEtiapine (SEROquel XR) 300 mg 24 hr tablet Take 300 mg by mouth daily at bedtime   • topiramate (TOPAMAX) 50 MG tablet Take 50 mg by mouth daily   • trifluoperazine (STELAZINE) 10 MG tablet Take 1 capsule by mouth 2 (two) times a day   • venlafaxine (EFFEXOR-XR) 150 mg 24 hr capsule Take 1 capsule by mouth daily   • venlafaxine (EFFEXOR-XR) 75 mg 24 hr capsule Take 1 capsule (75 mg total) by mouth daily   • [DISCONTINUED] QUEtiapine (SEROquel) 200 mg tablet Take 1 tablet by mouth     Allergies   Allergen Reactions   • Lamotrigine Shortness Of Breath   • Lisinopril Diarrhea   • Losartan Potassium-Hctz Rash     Immunization History    Administered Date(s) Administered   • INFLUENZA 10/01/2015, 09/01/2016, 09/25/2016, 09/29/2017, 09/30/2017, 09/30/2020, 10/02/2021   • Influenza Quadrivalent, 6-35 Months IM 09/01/2016, 09/30/2017   • Influenza, injectable, quadrivalent, preservative free 0.5 mL 12/08/2022   • Influenza, seasonal, injectable 10/01/2015   • Influenza, seasonal, injectable, preservative free 09/24/2015, 09/30/2018     Objective   /66   Pulse 82   Temp 98.5 °F (36.9 °C)   Wt 91.5 kg (201 lb 12.8 oz)   LMP 03/28/2025 (Exact Date)   SpO2 98%   BMI 29.80 kg/m²     Physical Exam  Cardiovascular:      Rate and Rhythm: Normal rate and regular rhythm.      Heart sounds: Normal heart sounds.      Comments: Carotids: no bruits  Ext: no edema  Pulmonary:      Effort: Pulmonary effort is normal. No respiratory distress.      Breath sounds: No wheezing or rales.   Psychiatric:         Behavior: Behavior normal.         Thought Content: Thought content normal.

## 2025-07-17 DIAGNOSIS — I10 ESSENTIAL HYPERTENSION: ICD-10-CM

## 2025-07-18 RX ORDER — AMLODIPINE BESYLATE 10 MG/1
10 TABLET ORAL DAILY
Qty: 30 TABLET | Refills: 0 | Status: SHIPPED | OUTPATIENT
Start: 2025-07-18

## 2025-08-13 ENCOUNTER — OFFICE VISIT (OUTPATIENT)
Dept: FAMILY MEDICINE CLINIC | Facility: CLINIC | Age: 50
End: 2025-08-13
Payer: COMMERCIAL

## 2025-08-16 DIAGNOSIS — I10 ESSENTIAL HYPERTENSION: ICD-10-CM

## 2025-08-18 RX ORDER — AMLODIPINE BESYLATE 10 MG/1
10 TABLET ORAL DAILY
Qty: 30 TABLET | Refills: 5 | Status: SHIPPED | OUTPATIENT
Start: 2025-08-18